# Patient Record
Sex: FEMALE | Race: WHITE | NOT HISPANIC OR LATINO | Employment: OTHER | URBAN - NONMETROPOLITAN AREA
[De-identification: names, ages, dates, MRNs, and addresses within clinical notes are randomized per-mention and may not be internally consistent; named-entity substitution may affect disease eponyms.]

---

## 2022-01-01 ENCOUNTER — RESULTS ONLY (OUTPATIENT)
Dept: RADIATION ONCOLOGY | Facility: HOSPITAL | Age: 82
End: 2022-01-01
Payer: COMMERCIAL

## 2022-01-01 ENCOUNTER — DOCUMENTATION ONLY (OUTPATIENT)
Dept: RADIATION ONCOLOGY | Facility: HOSPITAL | Age: 82
End: 2022-01-01
Payer: COMMERCIAL

## 2022-01-01 ENCOUNTER — TELEPHONE (OUTPATIENT)
Dept: RADIATION ONCOLOGY | Facility: HOSPITAL | Age: 82
End: 2022-01-01
Payer: COMMERCIAL

## 2022-01-01 ENCOUNTER — HEALTH MAINTENANCE LETTER (OUTPATIENT)
Age: 82
End: 2022-01-01

## 2022-01-01 ENCOUNTER — APPOINTMENT (OUTPATIENT)
Dept: RADIATION ONCOLOGY | Facility: HOSPITAL | Age: 82
End: 2022-01-01
Payer: COMMERCIAL

## 2022-01-01 ENCOUNTER — CARE COORDINATION (OUTPATIENT)
Dept: ONCOLOGY | Facility: OTHER | Age: 82
End: 2022-01-01

## 2022-01-01 ENCOUNTER — OFFICE VISIT (OUTPATIENT)
Dept: RADIATION ONCOLOGY | Facility: HOSPITAL | Age: 82
End: 2022-01-01
Attending: RADIOLOGY
Payer: MEDICARE

## 2022-01-01 ENCOUNTER — OFFICE VISIT (OUTPATIENT)
Dept: RADIATION ONCOLOGY | Facility: HOSPITAL | Age: 82
End: 2022-01-01
Payer: COMMERCIAL

## 2022-01-01 ENCOUNTER — ALLIED HEALTH/NURSE VISIT (OUTPATIENT)
Dept: RADIATION ONCOLOGY | Facility: HOSPITAL | Age: 82
End: 2022-01-01

## 2022-01-01 VITALS — BODY MASS INDEX: 18.03 KG/M2 | WEIGHT: 97 LBS

## 2022-01-01 VITALS — WEIGHT: 99.1 LBS | BODY MASS INDEX: 18.42 KG/M2

## 2022-01-01 VITALS — WEIGHT: 100.1 LBS | BODY MASS INDEX: 18.61 KG/M2

## 2022-01-01 VITALS — WEIGHT: 94.8 LBS | WEIGHT: 102 LBS | BODY MASS INDEX: 17.62 KG/M2 | BODY MASS INDEX: 18.96 KG/M2

## 2022-01-01 VITALS — WEIGHT: 96.8 LBS | BODY MASS INDEX: 18 KG/M2

## 2022-01-01 VITALS — WEIGHT: 102.1 LBS | BODY MASS INDEX: 18.98 KG/M2

## 2022-01-01 DIAGNOSIS — C67.8 MALIGNANT NEOPLASM OF OVERLAPPING SITES OF BLADDER (H): Primary | ICD-10-CM

## 2022-01-01 LAB
RAD ONC ARIA COURSE ID: NORMAL
RAD ONC ARIA COURSE LAST TREATMENT DATE: NORMAL
RAD ONC ARIA COURSE START DATE: NORMAL
RAD ONC ARIA COURSE TREATMENT ELAPSED DAYS: 10
RAD ONC ARIA COURSE TREATMENT ELAPSED DAYS: 11
RAD ONC ARIA COURSE TREATMENT ELAPSED DAYS: 14
RAD ONC ARIA COURSE TREATMENT ELAPSED DAYS: 15
RAD ONC ARIA COURSE TREATMENT ELAPSED DAYS: 16
RAD ONC ARIA COURSE TREATMENT ELAPSED DAYS: 17
RAD ONC ARIA COURSE TREATMENT ELAPSED DAYS: 18
RAD ONC ARIA COURSE TREATMENT ELAPSED DAYS: 21
RAD ONC ARIA COURSE TREATMENT ELAPSED DAYS: 22
RAD ONC ARIA COURSE TREATMENT ELAPSED DAYS: 23
RAD ONC ARIA COURSE TREATMENT ELAPSED DAYS: 24
RAD ONC ARIA COURSE TREATMENT ELAPSED DAYS: 25
RAD ONC ARIA COURSE TREATMENT ELAPSED DAYS: 28
RAD ONC ARIA COURSE TREATMENT ELAPSED DAYS: 29
RAD ONC ARIA COURSE TREATMENT ELAPSED DAYS: 30
RAD ONC ARIA COURSE TREATMENT ELAPSED DAYS: 31
RAD ONC ARIA COURSE TREATMENT ELAPSED DAYS: 35
RAD ONC ARIA COURSE TREATMENT ELAPSED DAYS: 36
RAD ONC ARIA COURSE TREATMENT ELAPSED DAYS: 37
RAD ONC ARIA COURSE TREATMENT ELAPSED DAYS: 38
RAD ONC ARIA COURSE TREATMENT ELAPSED DAYS: 39
RAD ONC ARIA COURSE TREATMENT ELAPSED DAYS: 42
RAD ONC ARIA COURSE TREATMENT ELAPSED DAYS: 43
RAD ONC ARIA COURSE TREATMENT ELAPSED DAYS: 44
RAD ONC ARIA COURSE TREATMENT ELAPSED DAYS: 7
RAD ONC ARIA COURSE TREATMENT ELAPSED DAYS: 8
RAD ONC ARIA COURSE TREATMENT ELAPSED DAYS: 9
RAD ONC ARIA FIRST TREATMENT DATE: NORMAL
RAD ONC ARIA PLAN FRACTIONS TREATED TO DATE: 1
RAD ONC ARIA PLAN FRACTIONS TREATED TO DATE: 1
RAD ONC ARIA PLAN FRACTIONS TREATED TO DATE: 10
RAD ONC ARIA PLAN FRACTIONS TREATED TO DATE: 11
RAD ONC ARIA PLAN FRACTIONS TREATED TO DATE: 12
RAD ONC ARIA PLAN FRACTIONS TREATED TO DATE: 13
RAD ONC ARIA PLAN FRACTIONS TREATED TO DATE: 14
RAD ONC ARIA PLAN FRACTIONS TREATED TO DATE: 15
RAD ONC ARIA PLAN FRACTIONS TREATED TO DATE: 16
RAD ONC ARIA PLAN FRACTIONS TREATED TO DATE: 17
RAD ONC ARIA PLAN FRACTIONS TREATED TO DATE: 18
RAD ONC ARIA PLAN FRACTIONS TREATED TO DATE: 19
RAD ONC ARIA PLAN FRACTIONS TREATED TO DATE: 2
RAD ONC ARIA PLAN FRACTIONS TREATED TO DATE: 2
RAD ONC ARIA PLAN FRACTIONS TREATED TO DATE: 20
RAD ONC ARIA PLAN FRACTIONS TREATED TO DATE: 3
RAD ONC ARIA PLAN FRACTIONS TREATED TO DATE: 3
RAD ONC ARIA PLAN FRACTIONS TREATED TO DATE: 4
RAD ONC ARIA PLAN FRACTIONS TREATED TO DATE: 4
RAD ONC ARIA PLAN FRACTIONS TREATED TO DATE: 5
RAD ONC ARIA PLAN FRACTIONS TREATED TO DATE: 5
RAD ONC ARIA PLAN FRACTIONS TREATED TO DATE: 6
RAD ONC ARIA PLAN FRACTIONS TREATED TO DATE: 6
RAD ONC ARIA PLAN FRACTIONS TREATED TO DATE: 7
RAD ONC ARIA PLAN FRACTIONS TREATED TO DATE: 7
RAD ONC ARIA PLAN FRACTIONS TREATED TO DATE: 8
RAD ONC ARIA PLAN FRACTIONS TREATED TO DATE: 9
RAD ONC ARIA PLAN ID: NORMAL
RAD ONC ARIA PLAN NAME: NORMAL
RAD ONC ARIA PLAN PRESCRIBED DOSE PER FRACTION: 2 GY
RAD ONC ARIA PLAN TOTAL FRACTIONS PRESCRIBED: 20
RAD ONC ARIA PLAN TOTAL FRACTIONS PRESCRIBED: 5
RAD ONC ARIA PLAN TOTAL FRACTIONS PRESCRIBED: 7
RAD ONC ARIA PLAN TOTAL PRESCRIBED DOSE: 1000 CGY
RAD ONC ARIA PLAN TOTAL PRESCRIBED DOSE: 1400 CGY
RAD ONC ARIA PLAN TOTAL PRESCRIBED DOSE: 4000 CGY
RAD ONC ARIA REFERENCE POINT DOSAGE GIVEN TO DATE: NORMAL GY
RAD ONC ARIA REFERENCE POINT ID: NORMAL

## 2022-01-01 PROCEDURE — 77386 HC IMRT TREATMENT DELIVERY, COMPLEX: CPT | Performed by: RADIOLOGY

## 2022-01-01 PROCEDURE — 77427 RADIATION TX MANAGEMENT X5: CPT | Performed by: RADIOLOGY

## 2022-01-01 PROCEDURE — 77014 PR CT GUIDE FOR PLACEMENT RADIATION THERAPY FIELDS: CPT | Mod: 26 | Performed by: RADIOLOGY

## 2022-01-01 PROCEDURE — 77336 RADIATION PHYSICS CONSULT: CPT | Performed by: RADIOLOGY

## 2022-01-01 RX ORDER — ONDANSETRON 8 MG/1
8 TABLET, FILM COATED ORAL 3 TIMES DAILY PRN
COMMUNITY
Start: 2022-01-01

## 2022-01-01 RX ORDER — CETIRIZINE HYDROCHLORIDE 10 MG/1
10 TABLET ORAL DAILY
COMMUNITY
Start: 2022-01-01

## 2022-01-01 RX ORDER — PROCHLORPERAZINE MALEATE 10 MG
10 TABLET ORAL
COMMUNITY
Start: 2022-01-01

## 2022-01-01 RX ORDER — LOPERAMIDE HCL 2 MG
2 CAPSULE ORAL 4 TIMES DAILY PRN
COMMUNITY
Start: 2022-01-01

## 2022-01-01 ASSESSMENT — ENCOUNTER SYMPTOMS
TROUBLE SWALLOWING: 0
RECTAL PAIN: 0
SLEEP DISTURBANCE: 1
DIFFICULTY URINATING: 1
HEADACHES: 0
WEAKNESS: 0
ADENOPATHY: 0
CONFUSION: 0
NERVOUS/ANXIOUS: 0
WOUND: 0

## 2022-01-01 ASSESSMENT — PAIN SCALES - GENERAL
PAINLEVEL: NO PAIN (0)
PAINLEVEL: SEVERE PAIN (6)
PAINLEVEL: MILD PAIN (2)
PAINLEVEL: MILD PAIN (2)
PAINLEVEL: MODERATE PAIN (5)
PAINLEVEL: SEVERE PAIN (6)
PAINLEVEL: SEVERE PAIN (6)
PAINLEVEL: MODERATE PAIN (5)

## 2022-03-01 RX ORDER — ACETAMINOPHEN 500 MG
500-1000 TABLET ORAL EVERY 6 HOURS PRN
COMMUNITY
End: 2022-01-01

## 2022-03-01 RX ORDER — PREDNISONE 1 MG/1
1 TABLET ORAL DAILY
COMMUNITY

## 2022-03-01 RX ORDER — OXYBUTYNIN CHLORIDE 5 MG/1
5 TABLET, EXTENDED RELEASE ORAL DAILY
COMMUNITY

## 2022-03-01 RX ORDER — OMEPRAZOLE 40 MG/1
40 CAPSULE, DELAYED RELEASE ORAL DAILY
COMMUNITY

## 2022-03-01 RX ORDER — PREDNISONE 5 MG/1
5 TABLET ORAL DAILY
COMMUNITY

## 2022-03-01 RX ORDER — HYDROCODONE BITARTRATE AND ACETAMINOPHEN 5; 325 MG/1; MG/1
1 TABLET ORAL EVERY 4 HOURS PRN
COMMUNITY
End: 2022-03-03

## 2022-03-03 RX ORDER — BIOTIN 1 MG
1000 TABLET ORAL DAILY
COMMUNITY
End: 2022-01-01

## 2022-03-03 RX ORDER — FAMOTIDINE 20 MG
2000 TABLET ORAL DAILY
COMMUNITY
End: 2022-01-01

## 2022-03-03 ASSESSMENT — PATIENT HEALTH QUESTIONNAIRE - PHQ9: SUM OF ALL RESPONSES TO PHQ QUESTIONS 1-9: 5

## 2022-03-03 ASSESSMENT — PAIN SCALES - GENERAL: PAINLEVEL: NO PAIN (0)

## 2022-03-03 NOTE — NURSING NOTE
"Spoke with patient on 3/3/2022, via phone prior to consult with Dr. Moran.  Jeannette was pleasant, cooperative, eager to communicate. Feels like her energy is good, she is upbeat and positive with conversation. She cares for self independently and uses no assistive devices for ambulation. She has a history of a bladder sling procedure and takes Ditropan, doesn't feel like it has helped her. Is willing to try other things to fix her urinary issues. She takes 6mg of Prednisone daily to help with her hair, reports taking this for years and if she stops her hair falls out. Denies pain, reports a \"down pressure, and poking feeling at all times\" in her bladder area. Takes occasional Tylenol to help this feeling.  She is concerned with the amount of times she has to urinate.  Reports having to go every half four around the clock. When she gets up to go at night she urinates \"1/3 cup to just a few drops\". Struggles with stress urinary incontinence, changing multiple pads through the day/night.  This has been ongoing for approximately a year. \"The down pressure\" is what had her go to the Doctor. She took Dulcolax this morning \"to empty out so I don't have that full feeling\", otherwise reports regular bowel habits.  Denies hematuria, no back or flank pain, absent for nausea and is able to eat a regular diet \"graze all day\".  Reports having poor sleep at night due to getting up every 1/2 hour. She has a base knowledge of cancer treatments from family and friends. Requests that if she gets radiation here at [facility] that it be in the morning so she can do chemo therapy after in [place], under the care of Dr. JOSEPH Friday.  She has no further questions or concerns at this time.  Her son will drive her and accompany to appointment tomorrow.     "

## 2022-03-03 NOTE — PROGRESS NOTES
[facility]  DEPARTMENT OF RADIATION ONCOLOGY  CONSULTATION NOTE    Name: Jeannette Johnston MRN: [id number]   : 1940 (81 year old)  Date of Service: Mar 4, 2022 Referring: Dr. Sanchez       Diagnosis and Cancer Staging  Malignant neoplasm of overlapping sites of bladder (H)  Staging form: Urinary Bladder, AJCC 8th Edition  - Clinical: Stage IIIA (cT3, cN0, cM0) - Signed by Suraj Moran MD on 3/4/2022       Summary   The patient is a 81 year old right-handed female who is actively treated for locally extensive bladder cancer with significant extravesicular extension but node-negative on CT. 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 Nodes: N0 per CT. Metastasis: M0 per CT.). Among the additional co-morbidities and social determinants affecting toxicity risk are the patient's commute (the patient in regards the commute as being long), reliance on her son for transportation and support, very poor urinary function (frequency, incontinence, adult disposable undergarments), prior bladder sling, and TURBT on 2022..  2022 CT    (Please note that EMR interfaces between institutions can result in loss of embedded images.)    History of Present Illness (CE)   The patient's oncologic history across multiple institutions is abstracted as follows:    Diagnosis/Staging/Treatment:    2021 Medicine evaluation: Presented with persistent dysuria. Also noted urgency, incomplete voiding, and worsening leakage with Valsalva maneuvers (sneeze, laugh, etc.). Did not resolve  with amoxicillin. Ultimately referred to urology.    01/20/202 Urology consultation: Reported worsening urinary function. Urinating approximately every hour during the day and waking approximately 3 times a night to urinate. Uncontrolled spontaneous passage of urine approximately 4-5 times per day (neal incontinence) that required 2-3 pads per day. Prior history of bladder sling performed approximately 20 years ago. Ditropan prescribed.    01/21/2022 Medicine follow-up: Re-evaluated for urinary frequency and incontinence. Reported pain in the left groin and right hip. Plain films demonstrated degenerative disease (pelvic fracture approximately 20 years ago).    02/01/2022 Urology follow-up: Office cystoscopy revealed a large left anterior bladder wall tumor. Recommended formal cystoscopy and resection of bladder tumor.    02/04/2022 Abdomen/pelvis CT with contrast: Large bladder mass measuring approximately 7.1-cm and involving most of the left bladder. Disease extending into the soft tissues at the left ureterovesical junction. Infiltration of perivesicular fat without discrete nodularity. No suspicious regional nodes or liver masses. Right adnexal cyst measuring 3-cm.    02/08/2022 Extensive but limited transurethral resection of bladder tumor (TURBT): Clinical impression that the bulk of the tumor extended outside the bladder wall. Operative report described an uncomplicated procedure. Incomplete resection due to massive extent of tumor. Pathology as above.    02/15/2022 Chest CT with contrast: Extensive emphysematous changes. No suspicious pulmonary, hilar, or mediastinal metastases. Nonspecific thyroid nodules measuring up to 1-cm (right).    02/22/2022 Medical oncology consultation: Suboptimal candidate for platinum-based neoadjuvant chemotherapy due to poor renal function. Recommended concurrent chemoradiation with gemcitabine for bladder preservation as an alternative to cystectomy.    We reviewed other  "conditions that can influence the choice of therapy and its morbidity. The patient lives with her son and relies on him for transportation and support. She has no prior in-depth knowledge of radiation therapy. The patient feels in fair general health. Nevertheless, she prides her self as historically being fiercely independent, very active physically, and previously very active socially (since the COVID-19 pandemic, the patient and her son have gone to extreme lengths to avoid contact with people). She referred to being nicknamed, \"Zippy\". Her principal complaint is her worsening urinary function. She urinates approximately every 30 minutes (she urinated multiple times during today's visit),, wears multiple adult disposable undergarments per day, and urinates at night without control lower forewarning (neal urinary incontinence). Urinary function has not improved since the limited TURBT. The patient's risk factors for bladder cancer include her advanced age, prior cigarette use, and chronic poor bladder function. She denies a history of Crohn's disease, ulcerative colitis, or collagen vascular diseases. She denies a history of hip replacement or avascular necrosis. She denies a history of radiation exposure, chemotherapy, or collagen vascular diseases. She denies recent known exposure to COVID-19, risky behaviors, or related symptoms including febrile, chest, gustatory, gastrointestinal, and systemic complaints.    Chemotherapy History: None.  Radiation History: None.  Implanted Cardiac Devices: None.  Implanted Chest Wall Infusion Port: None.    Past Medical History:   Diagnosis Date     Benign neoplasm of colon 02/26/2008     Closed displaced fracture of styloid process of left ulna, initial encounter 05/01/2015    resolved     Diplopia 08/01/2006    resolved     Dysesthesia of scalp 06/19/2019     Female stress incontinence 02/19/2007     Fracture of wrist 04/24/2015    Transverse fracture through the distal " radius     Generalized anxiety disorder      GERD (gastroesophageal reflux disease) 06/24/2013    12/10/2013 EGD Alberts's like changes in the low esophagus, Gastritis and old healed ulcerations, Path: shows chronic inflammation NO Barretts     Lyme disease 08/08/2006    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 Diverticulosis     COLONOSCOPY  04/23/2013    Very difficult exam and could not get out of the sigmoid with the colonoscope so EGD scope was used and we were able to pass this to the prox Right colon but the scope was not long enough to reach the cecum.  No polyps.     EGD TRANSORAL DIAGNOSTIC  12/10/2013    Alberts's like changes in the low esophagus. Gastirits and old healed ulcerations. Path results pending.     PERIPHERAL/INSERT CV CATHETER WITHOUT PORT/PUMP >5 YEARS  08/15/2006     UPPER GASTROINTESTINAL ENDOSCOPY  04/07/2016    Procedure: ESOPHAGOGASTRODUODENOSCOPY DIAGNOSTIC;  [facility];  Location: [facility] ENDOSCOPY  [address]     Outpatient Encounter Medications as of 3/4/2022   Medication Sig Dispense Refill     omeprazole (PRILOSEC) 40 MG DR capsule Take 40 mg by mouth daily Take 1 Capsule by mouth two times a day. Do not crush.  Take 20 mins before meals (Patient not taking: Reported on 4/27/2022)       oxybutynin ER (DITROPAN-XL) 5 MG 24 hr tablet Take 5 mg by mouth daily Take 1 Tablet by mouth one time a day. Do not crush. (Patient not taking: Reported on 4/27/2022)       predniSONE (DELTASONE) 1 MG tablet Take 1 mg by mouth daily TAKE 1 TAB BY MOUTH ONE TIME A DAY. TAKE WITH 5 MG TAB FOR TOTAL DOSE 6 MG PER DAY. TAKE WITH FOOD. (Patient not taking: No sig reported)       predniSONE (DELTASONE) 5 MG tablet Take 5 mg by mouth daily TAKE 1 TAB BY MOUTH ONE TIME A DAY. TAKE WITH 5 MG TAB FOR TOTAL DOSE 6 MG PER DAY. TAKE WITH FOOD. (Patient not taking: No sig reported)       [DISCONTINUED] biotin 1000 MCG TABS tablet Take 1,000 mcg by mouth daily       [DISCONTINUED] Vitamin D, Cholecalciferol, 25 MCG (1000 UT) CAPS Take 2,000 Units by mouth daily       [DISCONTINUED] acetaminophen (TYLENOL) 500 MG tablet Take 500-1,000 mg by mouth every 6 hours as needed for mild pain        [DISCONTINUED] HYDROcodone-acetaminophen (NORCO) 5-325 MG tablet Take 1 tablet by mouth every 4 hours as needed for severe pain Take 1 Tablet by mouth every four hours as needed for Pain . Limit acetaminophen to 4000 mg per day from all sources.       No facility-administered encounter medications on file as of 3/4/2022.     Allergies/Reactions: Patient has no known allergies.    Orders via Epic EMR: No orders of the defined types were placed in this encounter.    Social History     Social History Narrative    Jeannette, got her certificate in Nutrition management that she used when she worked at the [employer]ing home, retired in 1993. She is , mother of two children (daughter & son).  Her son is  and lives with her.  She reports she is  "ratna to have him there to help with hauling in wood and help with chores.  Her daughter lives 16 miles away from her and also helps when needed.  She has four grandchildren, two boys and two girls.  Has her drivers licence however lets her son do the driving. She reports her energy is \"good\" and stated, \"you have to get off the chair and do it\". Her hobbies include, crocheting, puzzles, and reading books.     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     Social History     Tobacco Use     Smoking status: Former Smoker     Packs/day: 1.50     Years: 35.00     Pack years: 52.50     Types: Cigarettes     Quit date: 1997     Years since quittin.2     Smokeless tobacco: Never Used   Vaping Use     Vaping Use: Never used   Substance Use Topics     Alcohol use: Yes     Comment: Occasional beer     Drug use: Never     Family History   Problem Relation Age of Onset     Tuberculosis Mother      Myocardial Infarction Father      Basal cell carcinoma Father      Heart Disease Father      Ovarian Cancer Sister      Heart Disease Maternal Grandmother      Cerebrovascular Disease Maternal Grandmother      Cerebrovascular Disease Paternal Grandfather      Breast Cancer Maternal Aunt      Ovarian Cancer Cousin    No other cancers in first or second degree relatives.    Baseline Review of Systems (prior to radiation therapy; supplemental to the History)   Review of Systems   Constitutional: Positive for fatigue (tired due to lack of sleep, but still has energy to clean and do household activities ). Negative for activity change, appetite change and unexpected weight change.   HENT: Negative for hearing loss and trouble " "swallowing.    Eyes: Negative for visual disturbance.   Respiratory: Negative for shortness of breath.    Cardiovascular: Negative for leg swelling.   Gastrointestinal: Positive for constipation (at times ). Negative for abdominal pain, anal bleeding, blood in stool, diarrhea, nausea, rectal pain and vomiting.   Genitourinary: Positive for decreased urine volume, difficulty urinating, frequency, pelvic pain (constant pressure; increased pain when urinating) and urgency. Negative for dysuria, flank pain, hematuria and vaginal discharge.   Musculoskeletal: Negative for gait problem.   Skin: Negative for rash and wound.   Neurological: Negative for dizziness, weakness, light-headedness and headaches.   Hematological: Negative for adenopathy.   Psychiatric/Behavioral: Positive for sleep disturbance (wakes very 30 minutes to urinate). Negative for behavioral problems and confusion. The patient is not nervous/anxious.      Patient Health Questionnaire-9 (PHQ-9)     PHQ 3/3/2022   PHQ-9 Total Score 5   Q9: Thoughts of better off dead/self-harm past 2 weeks Not at all     Physical Exam   KPS: 70 (cares for self but unable to carry on normal activity or do active work).  ECOG Status: 2 (Ambulatory and capable of all selfcare but unable to carry out any work activities; may need help with IADLs up and about > 50% of waking hours)   Vitals: Ht 1.562 m (5' 1.5\")   Wt 47.5 kg (104 lb 12.8 oz)   BMI 19.48 kg/m  .    Clinical Examination:  General: Upbeat, dynamic, engaging, energetic, spry. Appears clinically/medically stable. Appears in good general health. Appears fit. Appears rested. Appears stated age. Appears thin yet well-developed, well-nourished, and in no acute distress. Hernandez not appear acutely or chronically ill. Appears well groomed.  Head: No alopecia. Normocephalic.  Eyes: Glasses. Anicteric, vision intact.  ENT: Good volume. No hoarseness.  Neck: Soft, supple, symmetric, trachea midline.  Lymphatics: No cervical, " supraclavicular, suprasternal, periumbilical, or inguinal adenopathy.  Chest/Breasts: No port. No implanted cardiac device.  Lungs: Easy respirations, no use of accessory muscles. Clear to auscultation  Cardiovascular: No jugulovenous distension. No carotid pulsations. RRR, S1, S2.  Abdomen: Nondistended, nontender. Soft. Bowel sounds positive.  Pelvis: Urinated multiple times during today's visit. Nondistended, nontender. Soft. Bowel sounds positive.  MSK: Shoulder range of motion is good. No arm edema or hand edema. Good muscle tone. No tenderness on palpation. Good posture. No cords or calf tenderness.  Integument: No jaundice, pallor or ecchymosis.  Neurologic: Right-handed. Alert, oriented, fluent. Memory intact. Motor intact. Sensory intact. No ataxia.  Psychologic: Well-spoken about her cancer and therapy. Good dynamic with son (lives with son). Motivated. Clear, consistent thoughts and opinions. Led portions of the discussion. Good level of critical thinking and intelligence. Good comprehension and processing of new information. Did not require repeating of questions or answers. Progressively complex discussions and questions. Well-constructed answers. Complex speech and thought patterns. Appropriately anxious about radiation therapy and sad about diagnosis. Upbeat, relaxed, confident, and engaging. Pleasant, cooperative, insightful, concrete, and good judgment.    Physician Time on Day of Encounter, and MDM Data Reviewed and Analyzed on Day of Encounter   Time spent on patient activities is based on Chart review, Visit, and Documentation. Total time: 216 minutes.    MDM based on:       High risk element:  o Locally advanced bladder cancer. Urinary incontinence. Pending chemotherapy. Pending concurrent chemoradiation to the bladder.       Tests, documents, or independent historian(s) analyses include but are not limited to:  o Those referenced above in the HPI.       Independent Interpretations of tests  include but are not limited to:  o Those referenced above in the HPI.  o Ordered CT-the simulation.    Physician Information Review   I reviewed the case with the patient and her son, evaluated her, and discussed her treatment options and risks of therapy. I reviewed the medical records, radiographic information, and pathologic studies. I reviewed the imaging studies via PACS and with the patient and her son. I reviewed our intake sheets. I reviewed the case with medical oncology. The patient and her family are good historians, began the visit with good insights into the disease process, and acknowledged the potentially poor consequences of her disease. Her son  in particular has educated himself through a variety of educational media including the Internet.They demonstrated good comprehension of our discussion and explored the treatment options with good understanding. They asked pertinent questions and insightful follow-up questions. I illustrated the anatomy and field of treatment. We discussed the onsite and telemedicine coverage of our clinic. I offered to speak with her family members and friends today by speakerphone. The patient declined my offer but granted me permission to speak with them if they contact me or come to clinic. They declined referral for an additional opinion or conservative care. They accepted referral to our social work staff for additional resources including potential counseling. She granted me permission to exchange medical information and records with healthcare professionals. No therapeutic radiation protocols for the patient's disease are available at our Center. The patient was ready to learn and demonstrated no learning barriers. Her learning preferences included listening. She was given ample opportunity to ask questions, and all questions were answered to her satisfaction. We reviewed educational materials including Internet resources and provided the patient with written  materials.    We discussed the patient's diagnosis of a large bladder cancer, regional anatomy, stage designation, and risk-group. We reviewed patterns of failure after various local and systemic treatments. We reviewed her fair prognosis with multimodality treatment. We discussed factors specific to her disease including her personal circumstances, comorbidities, and other factors potentially impacting the risk of toxicity and clinical outcomes. We reviewed the concept of multimodality care and the relative contribution of each to the paradigm of treating bladder cancer. We discussed the use of radiation therapy as the principal therapeutic modality and omission of additional surgery for upfront local therapy. We discussed limitations of radiographic imaging in identifying the extent of disease. We discussed the potentially beneficial role of radiation therapy in the context of evolving standards and national guidelines of oncologic care such as the NCCN, ACR, and RUPA as well as management during the COVID-19 pandemic.    We discussed the nature of external beam radiation therapy from a Varian TrueBeam linac, concept of CT-based simulation, role of computerized treatment planning, and potential interventions to reduce the toxicity of radiation while improving the efficacy of treatment for bladder cancer. We would likely use image-guidance of either 3D or VMAT beam arrangements. Together, these techniques permit good coverage of complex target tissues (bladder, regional nodes) while maintaining adequate sparing of normal critical structures (rectum, small bowel. large bowel, hips, etc.). I would not use stereotactic radiation therapy, protons, TomoTherapy, brachytherapy, or radioprotectant agents. I do not feel that these methods offer a clear benefit for this patient. The patient appears to have no absolute contraindications to radiation therapy.    We discussed the relative risks, benefits, rationale, potential  side effects, alternatives, and adjuncts to radiation therapy for bladder cancer. Toxicities can be acute or chronic, and can negatively impact long-term quality of life. They can require high levels of supportive care and breaks in radiation therapy. I anticipate at least a moderate degree of acute urinary and rectal toxicity. Among the factors will increase the risk of toxicity are the co-morbidities and circumstances noted above. The toxicities include but are not limited to the genitourinary tract (dysuria, hematuria, frequency, obstruction, stenosis, urethritis, cystitis, incontinence, infertility, birth defects, etc.), gastrointestinal tract (nausea, vomiting, cramping, diarrhea, mucositis, bleeding, anorexia, impaired nutrition, perforation, fistula, adhesions, etc.), skin (poor cosmesis, erythema, hyperpigmentation, desquamation, breakdown, fibrosis, pruritus, etc.), hematopoietic system (anemia, thrombocytopenia, neutropenia, etc.), lymphatic system (local and regional lymphedema), peripheral nerves (weakness, numbness, paraesthesia, pain, etc.), vasculature (hemorrhage, fistula, obstruction, etc.), soft tissue necrosis, bone necrosis, pain, infection, and other organs as well as systemic toxicities (e.g., fatigue) and long-term risks such as second malignancy.     Physician Radiation Therapy Assessment/Plan   In summary, I concur with the recommendation of concurrent chemoradiation as definitive treatment for muscle-invasive, node-negative, surgically unresectable (primary extravesicular disease) bladder cancer. The patient has chronically poor urinary function but no relative or absolute contraindications to concurrent chemoradiation. However, she has numerous barriers to care as noted above in the summary. Given the circumstances, I favor either conventionally fractionated pelvic radiation therapy delivered over approximately 30-33 fractions or moderately hypofractionated radiation therapy that  incorporates a simultaneous integrated boost (SIB) delivered over a total of 20 fractions. Cytotoxic chemotherapy would be delivered concurrently with the radiation therapy. The patient and her son wished to proceed as recommended. We therefore obtained written consent. We were able to accommodate the patient's preference for simulation today and will attempt to accommodate the patient's request for early morning radiation unless there is a least one of the person or other works on a Friday afternoon besides very followed by chemotherapy in [place]. We answered all questions to their satisfaction. Thank you for allowing us to participate in the care of this very pleasant patient.      Suraj Moran MD, PhD  Roxy Caceres CNP  Department of Radiation Oncology  Tel: [phone]  Fax: [phone]    Care Team:  Keith Sanchez M.D., Ph.D. (medical oncology)  Calvin Davey M.D., Ph.D. (urology)  Jos Dee III, M.D. (medicine)

## 2022-03-04 ENCOUNTER — ONCOLOGY VISIT (OUTPATIENT)
Dept: RADIATION ONCOLOGY | Facility: HOSPITAL | Age: 82
End: 2022-03-04
Attending: RADIOLOGY
Payer: MEDICARE

## 2022-03-04 ENCOUNTER — ALLIED HEALTH/NURSE VISIT (OUTPATIENT)
Dept: RADIATION ONCOLOGY | Facility: HOSPITAL | Age: 82
End: 2022-03-04
Payer: COMMERCIAL

## 2022-03-04 VITALS — WEIGHT: 104.8 LBS | BODY MASS INDEX: 19.79 KG/M2 | HEIGHT: 61 IN

## 2022-03-04 DIAGNOSIS — C67.8 MALIGNANT NEOPLASM OF OVERLAPPING SITES OF BLADDER (H): Primary | ICD-10-CM

## 2022-03-04 DIAGNOSIS — C67.8 MALIGNANT NEOPLASM OF OVERLAPPING SITES OF BLADDER (H): Primary | Chronic | ICD-10-CM

## 2022-03-04 PROCEDURE — 77334 RADIATION TREATMENT AID(S): CPT | Mod: 26 | Performed by: RADIOLOGY

## 2022-03-04 PROCEDURE — 77263 THER RADIOLOGY TX PLNG CPLX: CPT | Performed by: RADIOLOGY

## 2022-03-04 PROCEDURE — 99417 PROLNG OP E/M EACH 15 MIN: CPT | Mod: GZ | Performed by: RADIOLOGY

## 2022-03-04 PROCEDURE — 99205 OFFICE O/P NEW HI 60 MIN: CPT | Mod: 25 | Performed by: RADIOLOGY

## 2022-03-04 PROCEDURE — G0463 HOSPITAL OUTPT CLINIC VISIT: HCPCS | Performed by: RADIOLOGY

## 2022-03-04 PROCEDURE — 77334 RADIATION TREATMENT AID(S): CPT | Performed by: RADIOLOGY

## 2022-03-04 ASSESSMENT — ENCOUNTER SYMPTOMS
NAUSEA: 0
HEMATURIA: 0
VOMITING: 0
ABDOMINAL PAIN: 0
LIGHT-HEADEDNESS: 0
UNEXPECTED WEIGHT CHANGE: 0
FLANK PAIN: 0
FREQUENCY: 1
FATIGUE: 1
DIARRHEA: 0
DYSURIA: 0
CONSTIPATION: 1
BLOOD IN STOOL: 0
ACTIVITY CHANGE: 0
APPETITE CHANGE: 0
SHORTNESS OF BREATH: 0
ANAL BLEEDING: 0
DIZZINESS: 0

## 2022-03-09 ENCOUNTER — TELEPHONE (OUTPATIENT)
Dept: RADIATION ONCOLOGY | Facility: HOSPITAL | Age: 82
End: 2022-03-09
Payer: COMMERCIAL

## 2022-03-09 NOTE — TELEPHONE ENCOUNTER
Spoke with patient for start date scheduling for Radiation treatment.  She agreed to Monday. March 14th, 2022 at 7:30 a.m. Leena Figueroa, Dosimetrist and Lavonne, from [facility] in [place] are aware of date and time.  Patient advised she will receive a call from, [facility] to schedule chemo treatments, Jeannette, verbalized understanding.

## 2022-03-10 PROCEDURE — 77301 RADIOTHERAPY DOSE PLAN IMRT: CPT | Performed by: RADIOLOGY

## 2022-03-10 PROCEDURE — 77338 DESIGN MLC DEVICE FOR IMRT: CPT | Mod: 26 | Performed by: RADIOLOGY

## 2022-03-10 PROCEDURE — 77300 RADIATION THERAPY DOSE PLAN: CPT | Performed by: RADIOLOGY

## 2022-03-10 PROCEDURE — 77338 DESIGN MLC DEVICE FOR IMRT: CPT | Performed by: RADIOLOGY

## 2022-03-10 PROCEDURE — 77300 RADIATION THERAPY DOSE PLAN: CPT | Mod: 26 | Performed by: RADIOLOGY

## 2022-03-10 PROCEDURE — 77301 RADIOTHERAPY DOSE PLAN IMRT: CPT | Mod: 26 | Performed by: RADIOLOGY

## 2022-03-14 ENCOUNTER — APPOINTMENT (OUTPATIENT)
Dept: RADIATION ONCOLOGY | Facility: HOSPITAL | Age: 82
End: 2022-03-14
Attending: RADIOLOGY
Payer: MEDICARE

## 2022-03-14 ENCOUNTER — RESULTS ONLY (OUTPATIENT)
Dept: RADIATION ONCOLOGY | Facility: HOSPITAL | Age: 82
End: 2022-03-14
Payer: COMMERCIAL

## 2022-03-14 LAB
RAD ONC ARIA COURSE ID: NORMAL
RAD ONC ARIA COURSE LAST TREATMENT DATE: NORMAL
RAD ONC ARIA COURSE START DATE: NORMAL
RAD ONC ARIA COURSE TREATMENT ELAPSED DAYS: 0
RAD ONC ARIA FIRST TREATMENT DATE: NORMAL
RAD ONC ARIA PLAN FRACTIONS TREATED TO DATE: 1
RAD ONC ARIA PLAN ID: NORMAL
RAD ONC ARIA PLAN NAME: NORMAL
RAD ONC ARIA PLAN PRESCRIBED DOSE PER FRACTION: 2 GY
RAD ONC ARIA PLAN TOTAL FRACTIONS PRESCRIBED: 20
RAD ONC ARIA PLAN TOTAL PRESCRIBED DOSE: 4000 CGY
RAD ONC ARIA REFERENCE POINT DOSAGE GIVEN TO DATE: NORMAL GY
RAD ONC ARIA REFERENCE POINT ID: NORMAL

## 2022-03-14 PROCEDURE — 77014 PR CT GUIDE FOR PLACEMENT RADIATION THERAPY FIELDS: CPT | Mod: 26 | Performed by: RADIOLOGY

## 2022-03-14 PROCEDURE — 77386 HC IMRT TREATMENT DELIVERY, COMPLEX: CPT | Performed by: RADIOLOGY

## 2022-03-15 ENCOUNTER — RESULTS ONLY (OUTPATIENT)
Dept: RADIATION ONCOLOGY | Facility: HOSPITAL | Age: 82
End: 2022-03-15
Payer: COMMERCIAL

## 2022-03-15 ENCOUNTER — APPOINTMENT (OUTPATIENT)
Dept: RADIATION ONCOLOGY | Facility: HOSPITAL | Age: 82
End: 2022-03-15
Payer: COMMERCIAL

## 2022-03-15 LAB
RAD ONC ARIA COURSE ID: NORMAL
RAD ONC ARIA COURSE LAST TREATMENT DATE: NORMAL
RAD ONC ARIA COURSE START DATE: NORMAL
RAD ONC ARIA COURSE TREATMENT ELAPSED DAYS: 1
RAD ONC ARIA FIRST TREATMENT DATE: NORMAL
RAD ONC ARIA PLAN FRACTIONS TREATED TO DATE: 2
RAD ONC ARIA PLAN ID: NORMAL
RAD ONC ARIA PLAN NAME: NORMAL
RAD ONC ARIA PLAN PRESCRIBED DOSE PER FRACTION: 2 GY
RAD ONC ARIA PLAN TOTAL FRACTIONS PRESCRIBED: 20
RAD ONC ARIA PLAN TOTAL PRESCRIBED DOSE: 4000 CGY
RAD ONC ARIA REFERENCE POINT DOSAGE GIVEN TO DATE: NORMAL GY
RAD ONC ARIA REFERENCE POINT ID: NORMAL

## 2022-03-15 PROCEDURE — 77014 PR CT GUIDE FOR PLACEMENT RADIATION THERAPY FIELDS: CPT | Mod: 26 | Performed by: RADIOLOGY

## 2022-03-15 PROCEDURE — 77386 HC IMRT TREATMENT DELIVERY, COMPLEX: CPT | Performed by: RADIOLOGY

## 2022-03-16 ENCOUNTER — RESULTS ONLY (OUTPATIENT)
Dept: RADIATION ONCOLOGY | Facility: HOSPITAL | Age: 82
End: 2022-03-16
Payer: COMMERCIAL

## 2022-03-16 ENCOUNTER — OFFICE VISIT (OUTPATIENT)
Dept: RADIATION ONCOLOGY | Facility: HOSPITAL | Age: 82
End: 2022-03-16
Attending: RADIOLOGY
Payer: MEDICARE

## 2022-03-16 VITALS — WEIGHT: 103 LBS | BODY MASS INDEX: 19.15 KG/M2

## 2022-03-16 DIAGNOSIS — C67.8 MALIGNANT NEOPLASM OF OVERLAPPING SITES OF BLADDER (H): Primary | ICD-10-CM

## 2022-03-16 LAB
RAD ONC ARIA COURSE ID: NORMAL
RAD ONC ARIA COURSE LAST TREATMENT DATE: NORMAL
RAD ONC ARIA COURSE START DATE: NORMAL
RAD ONC ARIA COURSE TREATMENT ELAPSED DAYS: 2
RAD ONC ARIA FIRST TREATMENT DATE: NORMAL
RAD ONC ARIA PLAN FRACTIONS TREATED TO DATE: 3
RAD ONC ARIA PLAN ID: NORMAL
RAD ONC ARIA PLAN NAME: NORMAL
RAD ONC ARIA PLAN PRESCRIBED DOSE PER FRACTION: 2 GY
RAD ONC ARIA PLAN TOTAL FRACTIONS PRESCRIBED: 20
RAD ONC ARIA PLAN TOTAL PRESCRIBED DOSE: 4000 CGY
RAD ONC ARIA REFERENCE POINT DOSAGE GIVEN TO DATE: NORMAL GY
RAD ONC ARIA REFERENCE POINT ID: NORMAL

## 2022-03-16 PROCEDURE — 77386 HC IMRT TREATMENT DELIVERY, COMPLEX: CPT | Performed by: RADIOLOGY

## 2022-03-16 ASSESSMENT — PAIN SCALES - GENERAL: PAINLEVEL: SEVERE PAIN (6)

## 2022-03-16 NOTE — PROGRESS NOTES
[facility]  DEPARTMENT OF RADIATION ONCOLOGY   ON TREATMENT VISIT (OTV) NOTE    Name: Jeannette Johnston MRN: [id number]   : 1940 (81 year old)  Date of Service: Mar 16, 2022 Referring: Dr. Sanchez     Diagnosis and Cancer Staging  Malignant neoplasm of overlapping sites of bladder (H)  Staging form: Urinary Bladder, AJCC 8th Edition  - Clinical: Stage IIIA (cT3, cN0, cM0) - Signed by Suraj Moran MD on 3/4/2022      Radiation Therapy   Site: Pelvic LNs and Bladder with bladder boost by volumetric modulated arc therapy (VMAT)   Treatment to Date    Received 3 fraction(s) = 600 cGy (at 200 each)    Remaining 17 + 5+ 7  fraction(s)    Goal 32 fractions = 6400 cGy (4000, 1000, 1400)     Concurrent chemoradiation week 0 (fractions 1-5): Grade 0 toxicity due to radiation therapy (CTCAE 5.0).  Week 1 (-10):   Week 2 (11-15):   Week 3 (16-20):   Week 4 (21-25):   Week 5 (26-30):   Week 6 (31-35):     Recent History    General: Accompanied by son.  Finds setup and treatment to be easy.     Chemotherapy: Concurrent cytotoxic chemotherapy.  Regimen: Gemcitabine (C1D1 2022).  Most recent: 2022  Next dose: 2022  Labs: Reviewed from 2022. Adequate to continue therapy.  Growth factors: None.  Scheduled supplemental IV fluids: None.    :  Stable at baseline. (baseline: incontinent day and night). She is having a difficult time with night time incontinence due to leaking through her disposable briefs.   Index: Grade 0.  Intervention: Continue to use disposable briefs, try using disposable swetha pads, limit fluids in the afternoon/ after supper.   Impression: Possible changes within 1-2 weeks.    GI: Stable at baseline. (baseline: ).  Index: Grade 0.  Intervention: Observe.  Impression: Possible changes within 1-2 weeks.    Gyn:  Stable at baseline.  Index: Grade 0.  Intervention: Observe.   Impression: Possible changes within 1-2 weeks.    Pain: Reports 6/10 chronic pelvic  pain as a constant cramping similar to a menstrual cycle  Index: Grade 0.  Intervention: Start taking Midol (2 pills, 3 times a day as needed) STOP taking tylenol   Impression: Anticipate changes within 1-2 weeks.    Skin, mucosa: At baseline.   Index: Grade 0.  Intervention: Observe.  Impression: Possible changes possible within 1-2 weeks.    Anorexia, FEN, weight: Stable at baseline   Index: Grade 0.  Intervention: Observe.  Impression: Possible changes possible within 1-2 weeks.    Fatigue: Stable at baseline   Index: Grade 0.  Intervention: Observe.  Impression: Progressive changes possible within 1-2 weeks.    ID: Daily COVID-19 screening negative for barriers to proceeding with radiation therapy.    Physical Examination   Vitals: Wt 46.7 kg (103 lb)   BMI 19.15 kg/m    Wt Readings from Last 3 Encounters:   03/16/22 46.7 kg (103 lb)   03/03/22 47.5 kg (104 lb 12.8 oz)     Clinical Examination:  KPS: 80 (effort for normal activity; some signs or symptoms of disease).  General: Appears clinically stable. Appears in good general health. Appears non-obese. Appears rested. Appears stated age. Appears well-developed, well-nourished, and in no acute distress. Does not appear acutely or chronically ill. Appears well groomed.  Head: No alopecia. Normocephalic.  Eyes: Anicteric. Vision intact.  ENT: Good volume. No hoarseness.  Neck: No jugulovenous distension.  Lymphatics: Deferred.  Lungs: Easy respirations, no use of accessory muscles. Clear to auscultation.  Cardiovascular: No jugulovenous distension. No carotid pulsations. RRR, S1, S2.  Abdomen: Nondistended, nontender. Soft. Bowel sounds positive.  Pelvis: Non distended, tender to palpation .  MSK: Full shoulder range of motion. No arm edema or hand edema. No cords or calf tenderness.  Integument: No jaundice or pallor. No cellulitis.  Neurologic: Alert, oriented, fluent. Memory intact. Motor intact. Sensory intact. No ataxia.  Psychologic: Well-spoken about her  cancer and therapy. Good dynamic with son. Motivated, upbeat, relaxed, confident, and engaging. Pleasant, cooperative, insightful, and concrete.    Roxy Moran MD, PhD  Department of Radiation Oncology  Tel: [phone]  Fax: [phone]

## 2022-03-16 NOTE — PATIENT INSTRUCTIONS
1.  Midol from your local store and take 2 capsules every 6 hours as needed for pelvic pain. Do not take more than 6 pills in one day.     2. STOP taking your tylenol     3. Take with food    4. Limit fluids in the afternoon and at bed time to help with the excess night incontinence     5. Try using a disposable briefs and a disposable pad underneath you when you sleep.

## 2022-03-17 ENCOUNTER — APPOINTMENT (OUTPATIENT)
Dept: RADIATION ONCOLOGY | Facility: HOSPITAL | Age: 82
End: 2022-03-17
Payer: COMMERCIAL

## 2022-03-17 ENCOUNTER — RESULTS ONLY (OUTPATIENT)
Dept: RADIATION ONCOLOGY | Facility: HOSPITAL | Age: 82
End: 2022-03-17
Payer: COMMERCIAL

## 2022-03-17 LAB
RAD ONC ARIA COURSE ID: NORMAL
RAD ONC ARIA COURSE LAST TREATMENT DATE: NORMAL
RAD ONC ARIA COURSE START DATE: NORMAL
RAD ONC ARIA COURSE TREATMENT ELAPSED DAYS: 3
RAD ONC ARIA FIRST TREATMENT DATE: NORMAL
RAD ONC ARIA PLAN FRACTIONS TREATED TO DATE: 4
RAD ONC ARIA PLAN ID: NORMAL
RAD ONC ARIA PLAN NAME: NORMAL
RAD ONC ARIA PLAN PRESCRIBED DOSE PER FRACTION: 2 GY
RAD ONC ARIA PLAN TOTAL FRACTIONS PRESCRIBED: 20
RAD ONC ARIA PLAN TOTAL PRESCRIBED DOSE: 4000 CGY
RAD ONC ARIA REFERENCE POINT DOSAGE GIVEN TO DATE: NORMAL GY
RAD ONC ARIA REFERENCE POINT ID: NORMAL

## 2022-03-17 PROCEDURE — 77014 PR CT GUIDE FOR PLACEMENT RADIATION THERAPY FIELDS: CPT | Mod: 26 | Performed by: RADIOLOGY

## 2022-03-17 PROCEDURE — 77386 HC IMRT TREATMENT DELIVERY, COMPLEX: CPT | Performed by: RADIOLOGY

## 2022-03-18 ENCOUNTER — RESULTS ONLY (OUTPATIENT)
Dept: RADIATION ONCOLOGY | Facility: HOSPITAL | Age: 82
End: 2022-03-18
Payer: COMMERCIAL

## 2022-03-18 ENCOUNTER — APPOINTMENT (OUTPATIENT)
Dept: RADIATION ONCOLOGY | Facility: HOSPITAL | Age: 82
End: 2022-03-18
Payer: COMMERCIAL

## 2022-03-18 LAB
RAD ONC ARIA COURSE ID: NORMAL
RAD ONC ARIA COURSE LAST TREATMENT DATE: NORMAL
RAD ONC ARIA COURSE START DATE: NORMAL
RAD ONC ARIA COURSE TREATMENT ELAPSED DAYS: 4
RAD ONC ARIA FIRST TREATMENT DATE: NORMAL
RAD ONC ARIA PLAN FRACTIONS TREATED TO DATE: 5
RAD ONC ARIA PLAN ID: NORMAL
RAD ONC ARIA PLAN NAME: NORMAL
RAD ONC ARIA PLAN PRESCRIBED DOSE PER FRACTION: 2 GY
RAD ONC ARIA PLAN TOTAL FRACTIONS PRESCRIBED: 20
RAD ONC ARIA PLAN TOTAL PRESCRIBED DOSE: 4000 CGY
RAD ONC ARIA REFERENCE POINT DOSAGE GIVEN TO DATE: NORMAL GY
RAD ONC ARIA REFERENCE POINT ID: NORMAL

## 2022-03-18 PROCEDURE — 77336 RADIATION PHYSICS CONSULT: CPT | Performed by: RADIOLOGY

## 2022-03-18 PROCEDURE — 77427 RADIATION TX MANAGEMENT X5: CPT | Performed by: RADIOLOGY

## 2022-03-18 PROCEDURE — 77386 HC IMRT TREATMENT DELIVERY, COMPLEX: CPT | Performed by: RADIOLOGY

## 2022-03-18 PROCEDURE — 77014 PR CT GUIDE FOR PLACEMENT RADIATION THERAPY FIELDS: CPT | Mod: 26 | Performed by: RADIOLOGY

## 2022-03-23 NOTE — PROGRESS NOTES
[facility] [place]  DEPARTMENT OF RADIATION ONCOLOGY   ON TREATMENT VISIT (OTV) NOTE    Name: Jeannette Johnston MRN: [id number]   : 1940 (81 year old)  Date of Service: Mar 23, 2022 Referring: Dr. Sanchez     Diagnosis and Cancer Staging  Malignant neoplasm of overlapping sites of bladder (H)  Staging form: Urinary Bladder, AJCC 8th Edition  - Clinical: Stage IIIA (cT3, cN0, cM0) - Signed by Suraj Moran MD on 3/4/2022      Radiation Therapy   Site: Pelvic LNs and Bladder with bladder boost by volumetric modulated arc therapy (VMAT)   Treatment to Date    Received 8 fraction(s) = 1600 cGy (at 200 each)    Remaining 12 + 5+ 7  fraction(s)    Goal 32 fractions = 6400 cGy (4000, 1000, 1400)     Concurrent chemoradiation week 0 (fractions 1-5): Grade 0 toxicity due to radiation therapy (CTCAE 5.0).  Week 1 (06-10): grade 0   Week 2 (11-15):   Week 3 (16-20):   Week 4 (21-25):   Week 5 (26-30):   Week 6 (31-35):     Recent History    General: Accompanied by son.  Finds setup and treatment to be easy.     Chemotherapy: Concurrent cytotoxic chemotherapy.  Regimen: Gemcitabine (C1D1 2022).  Most recent: 2022  Next dose: 2022  Labs: Reviewed from 2022. Adequate to continue therapy.  Growth factors: None.  Scheduled supplemental IV fluids: None.    :  Stable at baseline. (baseline: incontinent day and night). She continues having a difficult time with night time incontinence due to leaking through her disposable briefs.   Index: Grade 0.  Intervention: Continue to use disposable briefs, can add in a disposable pad within the breif, continue using disposable swetha pads, limit fluids in the afternoon/ after supper.   Impression: Possible changes within 1-2 weeks.    GI: Stable at baseline. (baseline: ).  Index: Grade 0.  Intervention: Observe.  Impression: Possible changes within 1-2 weeks.    Gyn:  Stable at baseline.  Index: Grade 0.  Intervention: Observe.   Impression:  Possible changes within 1-2 weeks.    Pain: Reports 6/10 chronic pelvic pain as a constant cramping similar to a menstrual cycle. Midol helps with pain when taking, pain back to 6/10 without intervention. Patient educated to use Midol as needed.   Index: Grade 0.  Intervention: Start taking Midol (2 pills, 3 times a day as needed) STOP taking tylenol.   Impression: Anticipate changes within 1-2 weeks.    Skin, mucosa: At baseline.   Index: Grade 0.  Intervention: Observe.  Impression: Possible changes possible within 1-2 weeks.    Anorexia, FEN, weight: Stable at baseline   Index: Grade 0.  Intervention: Observe.  Impression: Possible changes possible within 1-2 weeks.    Fatigue: Stable at baseline   Index: Grade 0.  Intervention: Observe.  Impression: Progressive changes possible within 1-2 weeks.    ID: Daily COVID-19 screening negative for barriers to proceeding with radiation therapy.    Physical Examination   Vitals: Wt 46.3 kg (102 lb)   BMI 18.96 kg/m    Wt Readings from Last 3 Encounters:   03/23/22 46.3 kg (102 lb)   03/16/22 46.7 kg (103 lb)   03/03/22 47.5 kg (104 lb 12.8 oz)     Clinical Examination:  KPS: 80 (effort for normal activity; some signs or symptoms of disease).  General: Appears clinically stable. Appears in good general health. Appears non-obese. Appears rested. Appears stated age. Appears well-developed, well-nourished, and in no acute distress. Does not appear acutely or chronically ill. Appears well groomed.  Head: No alopecia. Normocephalic.  Eyes: Anicteric. Vision intact.  ENT: Good volume. No hoarseness.  Neck: No jugulovenous distension.  Lymphatics: Deferred.  Lungs: Easy respirations, no use of accessory muscles. Clear to auscultation.  Cardiovascular: No jugulovenous distension. No carotid pulsations. RRR, S1, S2.  Abdomen: Nondistended, nontender. Soft. Bowel sounds positive.  Pelvis: Non distended, tender to palpation .  MSK: Full shoulder range of motion. No arm edema or hand  edema. No cords or calf tenderness.  Integument: No jaundice or pallor. No cellulitis.  Neurologic: Alert, oriented, fluent. Memory intact. Motor intact. Sensory intact. No ataxia.  Psychologic: Well-spoken about her cancer and therapy. Good dynamic with son. Motivated, upbeat, relaxed, confident, and engaging. Pleasant, cooperative, insightful, and concrete.    Physician Radiation Therapy Information Review   Radiation Oncology weekly review: Reviewed available labs and diagnostic studies. Interpreted as adequate to proceed with radiation therapy. Discussed management with Therapy, Treatment Planning, and Nursing. Review or weekly routine QA, linac imaging, and clinical set up are adequate to proceed with radiation therapy as prescribed.    Physician Radiation Therapy Assessment   Routine tolerance to radiation therapy.    Physician Radiation Therapy Plan   No new radiation therapy interventions. Boost/cone down pending. Have reviewed the graphical 3D plan with attention to dose to the pelvic LNs and bladder. Reviewed anticipated time course, nature, and potential interventions for managing toxicity during and after radiation therapy. Patient aware of onsite and telemedicine coverage of our clinic. He wished to proceed with treatment. All questions answered to the satisfaction of the patient and family.    MD Roxy Benson CNP, MD, PhD  Department of Radiation Oncology  Tel: [phone]  Fax: [phone]

## 2022-03-30 NOTE — PROGRESS NOTES
[facility]  DEPARTMENT OF RADIATION ONCOLOGY   ON TREATMENT VISIT (OTV) NOTE    Name: Jeannette Johnston MRN: [id number]   : 1940 (81 year old)  Date of Service: Mar 30, 2022 Referring: Dr. Sanchez     Diagnosis and Cancer Staging  Malignant neoplasm of overlapping sites of bladder (H)  Staging form: Urinary Bladder, AJCC 8th Edition  - Clinical: Stage IIIA (cT3, cN0, cM0) - Signed by Suraj Moran MD on 3/4/2022      Radiation Therapy   Site: Pelvic LNs and Bladder with bladder boost by volumetric modulated arc therapy (VMAT)   Treatment to Date    Received 13 fraction(s) = 2600 cGy (at 200 each)    Remaining 7 + 5+ 7  fraction(s)    Goal 32 fractions = 6400 cGy (4000, 1000, 1400)     Concurrent chemoradiation week 0 (fractions 1-5): Grade 0 toxicity due to radiation therapy (CTCAE 5.0).  Week 1 (06-10): grade 0   Week 2 (11-15): grade 1 fatigue  Week 3 (16-20):   Week 4 (21-25):   Week 5 (26-30):   Week 6 (32):     Recent History    General: Accompanied by son.  Finds setup and treatment to be easy.     Chemotherapy: Concurrent cytotoxic chemotherapy.  Regimen: Gemcitabine (C1D1 2022).  Most recent: 2022  Next dose: 2022  Labs: Reviewed from 2022. Adequate to continue therapy.  Growth factors: None.  Scheduled supplemental IV fluids: None.    Pain: Reports 2/10 chronic pelvic pain as a constant cramping similar to a menstrual cycle. Pain is improved from last week with the use of Midol 2-3 times per day as needed, previous pain 6/10. Patient educated to continue to use Midol as needed.    Index: Grade 0.  Intervention: continue taking Midol (2 pills, 3 times a day as needed) STOP taking tylenol.   Impression: Anticipate changes within 1-2 weeks.    :  Stable at baseline. (baseline: incontinent day and night). She continues having a difficult time with night time incontinence due to leaking through her disposable briefs. She has started using a towel placed  "between her legs at night to help with absorption and \"save\" the sheets from being saturated.   Index: Grade 0.  Intervention: Continue to use disposable briefs, can add in a disposable pad within the breif, continue using disposable swetha pads, limit fluids in the afternoon/ after supper.   Impression: Possible changes within 1-2 weeks.    Fatigue: more tired than normal, starting to take naps   Index: Grade 1.  Intervention: Observe.  Impression: Progressive changes possible within 1-2 weeks.    GI: Stable at baseline. (baseline: constipated at times, loose stools at times).  Index: Grade 0.  Intervention: uses Doculax as needed, imodium as needed  Impression: Possible changes within 1-2 weeks.    Gyn:  Stable at baseline.  Index: Grade 0.  Intervention: Observe.   Impression: Possible changes within 1-2 weeks.    Skin, mucosa: At baseline.   Index: Grade 0.  Intervention: Observe.  Impression: Possible changes possible within 1-2 weeks.    Anorexia, FEN, weight: Stable at baseline   Index: Grade 0.  Intervention: Observe.  Impression: Possible changes possible within 1-2 weeks.        ID: Daily COVID-19 screening negative for barriers to proceeding with radiation therapy.    Physical Examination   Vitals: Wt 46.3 kg (102 lb 1.6 oz)   BMI 18.98 kg/m    Wt Readings from Last 3 Encounters:   03/30/22 46.3 kg (102 lb 1.6 oz)   03/23/22 46.3 kg (102 lb)   03/16/22 46.7 kg (103 lb)     Clinical Examination:  KPS: 80 (effort for normal activity; some signs or symptoms of disease).  General: Appears clinically stable. Appears in good general health. Appears non-obese. Appears rested. Appears stated age. Appears well-developed, well-nourished, and in no acute distress. Does not appear acutely or chronically ill. Appears well groomed.  Head: No alopecia. Normocephalic.  Eyes: Anicteric. Vision intact.  ENT: Good volume. No hoarseness.  Neck: No jugulovenous distension.  Lymphatics: Deferred.  Lungs: Easy respirations, no " use of accessory muscles. Clear to auscultation.  Cardiovascular: No jugulovenous distension. No carotid pulsations. RRR, S1, S2.  Abdomen: Nondistended, nontender. Soft. Bowel sounds positive.  Pelvis: Non distended, tender to palpation .  MSK: Full shoulder range of motion. No arm edema or hand edema. No cords or calf tenderness.  Integument: No jaundice or pallor. No cellulitis.  Neurologic: Alert, oriented, fluent. Memory intact. Motor intact. Sensory intact. No ataxia.  Psychologic: Well-spoken about her cancer and therapy. Good dynamic with son. Motivated, upbeat, relaxed, confident, and engaging. Pleasant, cooperative, insightful, and concrete.      Roxy Caceres CNP   Suraj Moran MD, PhD  Department of Radiation Oncology  Tel: [phone]  Fax: [phone]

## 2022-04-05 NOTE — PROGRESS NOTES
[facility]  DEPARTMENT OF RADIATION ONCOLOGY   ON TREATMENT VISIT (OTV) NOTE    Name: Jeannette Johnston MRN: [id number]   : 1940 (81 year old)  Date of Service: 2022 Referring: Dr. Sanchez     Diagnosis and Cancer Staging  Malignant neoplasm of overlapping sites of bladder (H)  Staging form: Urinary Bladder, AJCC 8th Edition  - Clinical: Stage IIIA (cT3, cN0, cM0) - Signed by Suraj Moran MD on 3/4/2022      Radiation Therapy   Site: Bladder and pelvic nodes using volumetric-modulated arc therapy (VMAT) with sequential boost.   Treatment to Date    Received 18 fraction(s) = 3600 cGy (at 200 each)    Remaining 2 + 5 + 7 fraction(s)    Goal 32 fractions (20 + 5 + 7) = 6400 cGy (4000 + 1000 + 1400)     Concurrent chemoradiation week 0 (fractions 1-5): Grade 0 toxicity due to radiation therapy (CTCAE 5.0).  Week 1 (06-10): Grade 0.  Week 2 (11-15): Grade 1 fatigue.  Week 3 (16-20): Grade 1 fatigue. Improved urinary function.  Week 4 (21-25):   Week 5 (26-30):   Week 6 (32):     Summary   (Please note that EMR interfaces between institutions can result in loss of embedded images).      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 In the setting of medically inoperable/surgically unresectable disease, medical oncology referred the patient for concurrent chemoradiation thereapy with potentially curative intent through bladder-sparing therapy (Primary:  7.1-cm disease, the bulk of which clinically extends beyond the bladder. Overlies left ureterovesical junction. Nodes: cN0 per CT. Metastasis: cM0 per CT. Markers: None applicable). Among the additional co-morbidities and social determinants affecting toxicity risk are chronic bilateral lower medial groin pain (presenting complaint), poor bladder function (incontinence, wet sheets, adult disposable undergarments), pubovaginal sling, and advanced  "age..  02/04/2022 CT      Recent History (CE)   General: Accompanied by son and Care Coordinator. Saw medical oncology yesterday. Ultimately tolerating treatment relatively well. Feels well. Offers no new complaints. Finds setup and treatment to be easy. No bowel or bladder preparation prior to daily radiation therapy.  Chemotherapy: Concurrent cytotoxic chemotherapy.  Regimen:  Twice weekly gemcitabine (C1D1 03/14/2022).  Most recent:  04/04/2022.  Next dose:  04/07/2022  Labs: Reviewed from /04/2022. Adequate to continue therapy.  Growth factors: None.  Scheduled supplemental IV fluids: None.  Pain: Stable pain syndrome. Clarified that the pain is located at the bilateral lower medial groin. Began on the left side at the time of presentation. Then started on the right side. Similar to menses pain (but anatomically extrapelvic). Remains 2/10 with Midol as needed (previously 6/10). Reaches 6/10 without Midol. Patient satisfied with pain regimen.  Index: Grade 0 due to radiation therapy.  Intervention: Continue Midol as needed, typically 2-3 times per day (additional acetaminophen previously discontinued).  Impression: Pain due to bladder cancer and present prior to the start of radiation therapy.  Fatigue: Notes more fatigue. Naps now (not before radiation therapy).  Index: Grade 1.  Intervention: Observe.   Impression: Patient known as \"Zippy\" and usually very active. Remains active but nap is atypical for her baseline.  :  Reports 1 week history of improved urination. Can now sometimes control initiation of void on the toilet. Otherwise, stable at compromised baseline (incontinent day/night, adult disposable undergarments, leaks through undergarments at night, wet sheets). Denies hematuria or dysuria. No foul smelling or clouded urine.  Index: Grade 0 due to radiation therapy.  Intervention: Continue disposable undergarment. Use chux pads or towels at night as needed.  Impression: Do not anticipate significant " improvement of bladder function after completion of chemoradiation (due to long history of bladder dysfunction and degree of bladder wall destruction by cancer).  GI: Stable at baseline (occasional constipation versus loose stools).  Index: Grade 0 due to radiation therapy.  Intervention: Continue Dulcolax versus Imodium as needed (historical preference).   Impression: Ongoing complaints represent patient's historical based rather than treatment-related toxicity.  Gyn:        No complaints. Denies vaginal discharge or passage of urine through vagina.  Index: Grade 0.  Intervention: Observe.   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 Previously counseled about posttreatment use of vaginal dilator (in lieu of sexual activity) to maintain pliability to permit formal gynecologic evaluation.  ID: Daily COVID-19 screening negative for barriers to proceeding with radiation therapy.  Follow-up: After radiation therapy, refer back to medical oncology and urology for clinical and radiographic surveillance. Anticipate possible pelvic CT approximately 1 month after the end of radiation therapy to assess treatment response and the role of cystoscopy for surveillance if radiographically without evidence of significant disease.    ROS (score of 0 indicates that symptom is at baseline for most sections below)   See Flowsheet for additional comments as indicated.  Severe Pain (6) due to bilateral lower groins.                               Physical Examination   Vitals: There were no vitals taken for this visit.  Wt Readings from Last 3 Encounters:   04/06/22 45.4 kg (100 lb 1.6 oz)   03/30/22 46.3 kg (102 lb 1.6 oz)   03/23/22 46.3 kg (102 lb)     Clinical Examination (during the week, additional physician evaluations at linac with the staff):  KPS: 70 (cares for self but unable to carry on normal activity or do active work).  General: Bright, dynamic, engaging. Appears clinically stable. Appears in excellent general  health. Appears non-obese. Appears rested. Appears stated age. Appears well-developed, well-nourished, and in no acute distress. Does not appear acutely or chronically ill. Appears well groomed.  Head: No alopecia. Normocephalic.  Eyes: Glasses. Anicteric. Vision intact.  ENT: Good volume. No hoarseness.  Neck: No jugulovenous distension.  Lymphatics: Deferred.  Chest/Breasts: No port. No implanted cardiac device.   Lungs: Easy respirations, no use of accessory muscles. Clear to auscultation.  Cardiovascular: No jugulovenous distension. No carotid pulsations. RRR, S1, S2.  Abdomen: No erythema. Obesely distended. Otherwise, nondistended and nontender. Soft. Bowel sounds positive.  Pelvis: Adult disposable undergarment. No erythema. Obesely distended. Otherwise, nondistended and nontender. Soft. Bowel sounds positive.  MSK: Shoulder range of motion is good. No arm edema or hand edema.   Integument: No jaundice or pallor.  Neurologic: Right-handed. Alert, oriented, fluent. Memory intact. Motor intact. Sensory intact. No ataxia.  Psychologic: Well-spoken about her cancer and therapy. Good dynamic with son Motivated, upbeat, relaxed, confident, and engaging. Pleasant, cooperative, insightful, and concrete.    Physician Radiation Therapy Impression   Routine tolerance to radiation therapy.    Physician Radiation Therapy Plan   No new interventions. Boost/cone down pending. Have reviewed graphical 3D plan with the patient and her son. Reviewed anticipated time course, nature, and potential interventions for managing toxicity during and after radiation therapy. Patient aware of onsite and telemedicine coverage of our clinic. She wished to proceed with treatment. All questions answered to the satisfaction of the patient and her son.    Physician Radiation Therapy Weekly Review   Reviewed available labs and diagnostic studies. Interpreted as adequate to proceed with radiation therapy. Discussed management with Therapy,  Treatment Planning, and Nursing. Reviewed weekly routine QA, linac imaging, and clinical set up are adequate to proceed with radiation therapy as prescribed.    Allergies, Medications   Allergies/Reactions: Patient has no known allergies.    Outpatient Encounter Medications as of 4/6/2022   Medication Sig Dispense Refill     acetaminophen-caff-pyrilamine (MIDOL MENSTRUAL) 500-60-15 MG TABS per tablet Take 2 tablets by mouth 3 times daily Some days is taking three times per day and some 2 times per day       biotin 1000 MCG TABS tablet Take 1,000 mcg by mouth daily       omeprazole (PRILOSEC) 40 MG DR capsule Take 40 mg by mouth daily Take 1 Capsule by mouth two times a day. Do not crush.  Take 20 mins before meals       oxybutynin ER (DITROPAN-XL) 5 MG 24 hr tablet Take 5 mg by mouth daily Take 1 Tablet by mouth one time a day. Do not crush.       predniSONE (DELTASONE) 1 MG tablet Take 1 mg by mouth daily TAKE 1 TAB BY MOUTH ONE TIME A DAY. TAKE WITH 5 MG TAB FOR TOTAL DOSE 6 MG PER DAY. TAKE WITH FOOD.       predniSONE (DELTASONE) 5 MG tablet Take 5 mg by mouth daily TAKE 1 TAB BY MOUTH ONE TIME A DAY. TAKE WITH 5 MG TAB FOR TOTAL DOSE 6 MG PER DAY. TAKE WITH FOOD.       Vitamin D, Cholecalciferol, 25 MCG (1000 UT) CAPS Take 2,000 Units by mouth daily       No facility-administered encounter medications on file as of 4/6/2022.          Suraj Moran MD, PhD  Department of Radiation Oncology  Tel: [phone]  Fax: [phone]

## 2022-04-06 NOTE — PROGRESS NOTES
"Care Coordination Note:    SW reached out to patient via telephone today. SW explained to patient her role in the unit and reason for today's call. Patient stated her understanding. When SW inquired how things were going and if there was anything she needed assistance with at this time, she stated, \"Things have been good so far.\"   Patient was thankful for call and stated she would reach out to SW if needed.  SW gave contact information for any further assistance.    ALEJANDRA Gramajo  "

## 2022-04-06 NOTE — NURSING NOTE
Lower abdominal/bilateral sides of groin area, cramping, constant, 6/10 with Midol this a.m. Once Midol takes effect pain around 2/10. No further questions today.

## 2022-04-11 NOTE — PROGRESS NOTES
[facility]  DEPARTMENT OF RADIATION ONCOLOGY   ON TREATMENT VISIT (OTV) NOTE    Name: Jeannette Johnston MRN: [id number]   : 1940 (81 year old)  Date of Service: 2022 Referring: Dr. Sanchez     Diagnosis and Cancer Staging  Malignant neoplasm of overlapping sites of bladder (H)  Staging form: Urinary Bladder, AJCC 8th Edition  - Clinical: Stage IIIA (cT3, cN0, cM0) - Signed by Suraj Moran MD on 3/4/2022      Radiation Therapy   Site: Bladder and pelvic nodes using volumetric-modulated arc therapy (VMAT) with sequential boost.   Treatment to Date    Received 21 fraction(s) = 4200 cGy (at 200 each)    Remaining 0 + 4 + 7 fraction(s)    Goal 32 fractions (20 + 5 + 7) = 6400 cGy (4000 + 1000 + 1400)     Concurrent chemoradiation week 0 (fractions 1-5): Grade 0 toxicity due to radiation therapy (CTCAE 5.0).  Week 1 (06-10): Grade 0.  Week 2 (11-15): Grade 1 fatigue.  Week 3 (16-20): Grade 1 fatigue. Improved urinary function.  Week 4 (21-25): Grade 1 fatigue (nausea after chemotherapy).  Week 5 (26-30):   Week 6 (32):     Summary   (Please note that EMR interfaces between institutions can result in loss of embedded images).      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 In the setting of medically inoperable/surgically unresectable disease, medical oncology referred the patient for concurrent chemoradiation thereapy with potentially curative intent through bladder-sparing therapy (Primary:  7.1-cm disease, the bulk of which clinically extends beyond the bladder. Overlies left ureterovesical junction. Nodes: cN0 per CT. Metastasis: cM0 per CT. Markers: None applicable). Among the additional co-morbidities and social determinants affecting toxicity risk are chronic bilateral lower medial groin pain (presenting complaint), poor bladder function (incontinence, wet sheets, adult disposable undergarments),  pubovaginal sling, and advanced age..  02/04/2022 CT      Recent History (CE)   General: Accompanied by son. Seen before treatment due to GI as below. Otherwise feels well. Reports that she feels that she has no toxicity from radiation therapy. Finds setup and treatment to be easy. No bowel or bladder preparation prior to daily radiation therapy.  Chemotherapy: Concurrent cytotoxic chemotherapy.  Regimen:  Twice weekly gemcitabine (C1D1 03/14/2022).  Most recent:  04/07/2022.  Next dose:  04/11/2022  Labs: Reviewed from 04/04/2022. Adequate to continue therapy.  Growth factors: None.  Scheduled supplemental IV fluids: None.  GI: Complains of significant nausea and vomiting since last Thursday's chemotherapy. Reports having received a new antimedic during the regimen. Reports not having an antiemetic at home-has received no antiemetic therapy since leaving her infusion center on Thursday. Little per oral intake of food or liquids. Feeling more fatigued. Otherwise, stable at baseline (occasional constipation versus loose stools). No diarrhea, cramping, or blood on stools.   Index: Grade 0 due to radiation therapy.  Intervention: We will notify medical oncology of referral to service for evaluation of antiemetic regimen for chemotherapy. Continue Dulcolax versus Imodium as needed (historical preference).   Impression: Ongoing bowel habits represent patient's historical based rather than treatment-related toxicity.  Pain: Stable pain syndrome. Previously clarified that the pain is located at the bilateral lower medial groin. Began on the left side at the time of presentation. Then started on the right side. Similar to menses pain (but anatomically extrapelvic). Remains 2/10 with Midol as needed (previously 6/10). Reaches 6/10 without Midol. Patient satisfied with pain regimen.  Index: Grade 0 due to radiation therapy.  Intervention: Continue Midol as needed, typically 2-3 times per day (additional acetaminophen  "previously discontinued).  Impression: Pain due to bladder cancer and present prior to the start of radiation therapy.  Fatigue: Reports more fatigue over the weekend. Attributes to poor poor oral intake. Prior to treatment, was very active and dynamic. Since starting chemoradiation, has been fatigued and started napping (not before radiation therapy).  Index: Grade 1.  Intervention: Observe.   Impression: Patient known as \"Zippy\" and usually very active. Remains active but nap is atypical for her baseline.  :  Reports 1 week history of improved urination. Can now sometimes control initiation of void on the toilet. Otherwise, stable at compromised baseline (incontinent day/night, adult disposable undergarments, leaks through undergarments at night, wet sheets). Denies hematuria or dysuria. No foul smelling or clouded urine.  Index: Grade 0 due to radiation therapy.  Intervention: Continue disposable undergarment. Use chux pads or towels at night as needed.  Impression: Do not anticipate significant improvement of bladder function after completion of chemoradiation (due to long history of bladder dysfunction and degree of bladder wall destruction by cancer).  Gyn:        No complaints. Denies vaginal discharge or passage of urine through vagina.  Index: Grade 0.  Intervention: Observe.   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 Previously counseled about posttreatment use of vaginal dilator (in lieu of sexual activity) to maintain pliability to permit formal gynecologic evaluation.  ID: Daily COVID-19 screening negative for barriers to proceeding with radiation therapy.  Follow-up: After radiation therapy, refer back to medical oncology and urology for clinical and radiographic surveillance. Anticipate possible pelvic CT approximately 1 month after the end of radiation therapy to assess treatment response and the role of cystoscopy for surveillance if radiographically without evidence of significant " disease.    ROS (score of 0 indicates that symptom is at baseline for most sections below)   See Flowsheet for additional comments as indicated.  Mild Pain (2) due to bilateral lower groins.                               Physical Examination   Vitals: Wt 44 kg (97 lb)   BMI 18.03 kg/m    Wt Readings from Last 3 Encounters:   04/11/22 44 kg (97 lb)   04/06/22 45.4 kg (100 lb 1.6 oz)   04/06/22 45.4 kg (100 lb 1.6 oz)     Clinical Examination (during the week, additional physician evaluations at [facility] with the staff):  KPS: 70 (cares for self but unable to carry on normal activity or do active work).  General: Appears slightly more fatigued but nevertheless bright, dynamic, engaging. Appears clinically stable. Appears in excellent general health. Appears non-obese. Appears rested. Appears stated age. Appears well-developed, well-nourished, and in no acute distress. Does not appear acutely or chronically ill. Appears well groomed.  Head: No alopecia. Normocephalic.  Eyes: Glasses. Anicteric. Vision intact.  ENT: Good volume. No hoarseness.  Neck: No jugulovenous distension.  Lymphatics: Deferred.  Chest/Breasts: No port. No implanted cardiac device.   Lungs: Easy respirations, no use of accessory muscles. Clear to auscultation.  Cardiovascular: No jugulovenous distension. No carotid pulsations. RRR, S1, S2.  Abdomen: No erythema. Nondistended, nontender. Soft. Bowel sounds positive.  Pelvis: Adult disposable undergarment. No erythema. Nondistended, nontender. Soft. Bowel sounds positive.  MSK: Shoulder range of motion is good. No arm edema or hand edema.   Integument: No jaundice or pallor.  Neurologic: Right-handed. Alert, oriented, fluent. Memory intact. Motor intact. Sensory intact. No ataxia.  Psychologic: Well-spoken about her cancer and therapy. Good dynamic with son Motivated, upbeat, relaxed, confident, and engaging. Pleasant, cooperative, insightful, and concrete.    Physician Radiation Therapy Impression    Routine tolerance to radiation therapy.    Physician Radiation Therapy Plan   No new interventions. Boost/cone down ongoing. Have reviewed graphical 3D plan with the patient and her son. Reviewed anticipated time course, nature, and potential interventions for managing toxicity during and after radiation therapy. Patient aware of onsite and telemedicine coverage of our clinic. She wished to proceed with treatment. All questions answered to the satisfaction of the patient and her son.    Physician Radiation Therapy Weekly Review   Reviewed available labs and diagnostic studies. Interpreted as adequate to proceed with radiation therapy. Discussed management with Therapy, Treatment Planning, and Nursing. Reviewed weekly routine QA, linac imaging, and clinical set up are adequate to proceed with radiation therapy as prescribed.    Allergies, Medications   Allergies/Reactions: Patient has no known allergies.    Outpatient Encounter Medications as of 4/11/2022   Medication Sig Dispense Refill     acetaminophen-caff-pyrilamine (MIDOL MENSTRUAL) 500-60-15 MG TABS per tablet Take 2 tablets by mouth 3 times daily Some days is taking three times per day and some 2 times per day       biotin 1000 MCG TABS tablet Take 1,000 mcg by mouth daily       omeprazole (PRILOSEC) 40 MG DR capsule Take 40 mg by mouth daily Take 1 Capsule by mouth two times a day. Do not crush.  Take 20 mins before meals       oxybutynin ER (DITROPAN-XL) 5 MG 24 hr tablet Take 5 mg by mouth daily Take 1 Tablet by mouth one time a day. Do not crush.       predniSONE (DELTASONE) 1 MG tablet Take 1 mg by mouth daily TAKE 1 TAB BY MOUTH ONE TIME A DAY. TAKE WITH 5 MG TAB FOR TOTAL DOSE 6 MG PER DAY. TAKE WITH FOOD.       predniSONE (DELTASONE) 5 MG tablet Take 5 mg by mouth daily TAKE 1 TAB BY MOUTH ONE TIME A DAY. TAKE WITH 5 MG TAB FOR TOTAL DOSE 6 MG PER DAY. TAKE WITH FOOD.       Vitamin D, Cholecalciferol, 25 MCG (1000 UT) CAPS Take 2,000 Units by mouth  daily       No facility-administered encounter medications on file as of 4/11/2022.          Suraj Moran MD, PhD  Department of Radiation Oncology  Tel: [phone]  Fax: [phone]

## 2022-04-12 NOTE — PROGRESS NOTES
[facility]  DEPARTMENT OF RADIATION ONCOLOGY   ON TREATMENT VISIT (OTV) NOTE    Name: Jeannette Johnston MRN: [id number]   : 1940 (81 year old)  Date of Service: 2022 Referring: Dr. Sanchez     Diagnosis and Cancer Staging  Malignant neoplasm of overlapping sites of bladder (H)  Staging form: Urinary Bladder, AJCC 8th Edition  - Clinical: Stage IIIA (cT3, cN0, cM0) - Signed by Suraj Moran MD on 3/4/2022      Radiation Therapy   Site: Bladder and pelvic nodes using volumetric-modulated arc therapy (VMAT) with sequential boost.   Treatment to Date    Received 23 fraction(s) = 4600 cGy (at 200 each)    Remaining 0 + 2 + 7 fraction(s)    Goal 32 fractions (20 + 5 + 7) = 6400 cGy (4000 + 1000 + 1400)     Concurrent chemoradiation week 0 (fractions 1-5): Grade 0 toxicity due to radiation therapy (CTCAE 5.0).  Week 1 (06-10): Grade 0.  Week 2 (11-15): Grade 1 fatigue.  Week 3 (16-20): Grade 1 fatigue. Improved urinary function.  Week 4 (21-25): Grade 1 fatigue, grade 2 diarrhea (easily managed with daily Imodium. Zofran for nausea after chemotherapy.  Week 5 (26-30):   Week 6 (32):     Summary    The patient is a 81 year old right-handed female who is actively treated (chemoradiation) for muscle-invasive bladder cancer. In the setting of medically inoperable/surgically unresectable disease, medical oncology referred the patient for concurrent chemoradiation thereapy with potentially curative intent through bladder-sparing therapy (Primary:  7.1-cm disease, the bulk of which clinically extends beyond the bladder. Overlies left ureterovesical junction. Nodes: cN0 per CT. Metastasis: cM0 per CT. Markers: None applicable). Among the additional co-morbidities and social determinants affecting toxicity risk are chronic bilateral lower medial groin pain (presenting complaint), poor bladder function (incontinence, wet sheets, adult disposable undergarments), pubovaginal sling, and advanced  age..  02/04/2022 CT    (Please note that EMR interfaces between institutions can result in loss of embedded images.)    Recent History (CE)   General: Accompanied by son. Seen before, during, and after treatment in clinic). Spectrum of changes as below. Still finds set-up and treatment to be easy.No bowel or bladder preparation prior to daily radiation therapy.  Chemotherapy: Concurrent cytotoxic chemotherapy.  Regimen:  Twice weekly gemcitabine (C1D1 03/14/2022).  Most recent:  04/11/2022.  Next dose:  04/14/2022  Labs: Reviewed from 04/11/2022. Adequate to continue therapy.  Growth factors: None.  Scheduled supplemental IV fluids: None.  Fatigue, malaise, sinus congestion, insomnia Reports a several day history of poor sleep due to increasing sensation of nasal/sinus congestion with a dry cough. Not aware of postnasal drip. Also experiencing metallic taste that interferes with eating. Notes increased xerostomia that has led the patient to carry a bag of ice chips for moisturization. Son has similar complaint of nasal congestion and cough. Reports that both experience it yearly. Son will see primary care practitioner on Friday. Both deny fevers or chills. Aside from medical visits, both have isolated themselves and thus avoided external contact due to the ongoing COVID-19 pandemic.  Index: Grade 0 due to radiation therapy.  Intervention: Start Zyrtec or Allegra today for possible yearly seasonal allergy (symptoms have started in the community). Home test today for COVID-19 due to character of spectrum of complaints above the diaphragm and systemic complaints that are atypical for pelvic chemoradiation. Son will see primary care physician on Friday for assessment of similar complaints. Counseled son and patient about precautions of COVID-19 test is positive. Due to the patient's complaints today (Wednesday), would likely defer additional chemoradiation until the end of the upcoming holiday weekend, Monday  "04/18/2022.  Impression: As above.  GI, FEN: Nausea improved after starting Zofran yesterday as prescribed by medical oncology. Daily loose stool continues to be controlled with Imodium since 04/07/2022 through medical oncology. Weight stable despite report of diminished per oral intake due to malaise. Denies vomiting, cramping, or blood on stools. (Baseline: occasional constipation versus loose stools).   Index: Grade 2 due to concurrent chemoradiation.   Intervention: Continue Imodium as needed for diarrhea and Zofran as needed for nausea. Continue Dulcolax versus Imodium as needed for historical alternating bowel habits (patient's preferred historical regimen).   Impression: Easily managed and tolerated bowel habit changes that are superimposed historical baseline of compromise.   Pain: Stable pain syndrome. Had reviously clarified that the pain is located at the bilateral lower medial groin. Began on the left side at the time of presentation. Then started on the right side. Similar to menses pain (but anatomically extrapelvic). Remains 2/10 with Midol as needed (previously 6/10). Reaches 6/10 without Midol. Patient satisfied with pain regimen.  Index: Grade 0 due to radiation therapy.  Intervention: Continue Midol as needed, typically 2-3 times per day (additional acetaminophen previously discontinued).  Impression: Pain due to bladder cancer and present prior to the start of radiation therapy.  Fatigue: As above. Previously had attributeed to poor poor oral intake. Prior to treatment, was very active and dynamic. Since starting chemoradiation, has been fatigued and started napping (not before radiation therapy).  Index: Grade 1.  Intervention: Observe.   Impression: Patient known as \"Zippy\" and usually very active. Remains active but nap is atypical for her baseline.  :  Reports 2 week history of improved urination. Can now sometimes control initiation of void on the toilet. Otherwise, stable at compromised " baseline (incontinent day/night, adult disposable undergarments, leaks through undergarments at night, wet sheets). Denies hematuria or dysuria. No foul smelling or clouded urine.  Index: Grade 0 due to radiation therapy.  Intervention: Continue disposable undergarment. Use chux pads or towels at night as needed.  Impression: Do not anticipate significant improvement of bladder function after completion of chemoradiation (due to long history of bladder dysfunction and degree of bladder wall destruction by cancer).  Gyn:        No complaints. Denies vaginal discharge or passage of urine through vagina.  Index: Grade 0.  Intervention: Observe.   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 Previously counseled about posttreatment use of vaginal dilator (in lieu of sexual activity) to maintain pliability to permit formal gynecologic evaluation.  ID: As above. Otherwise, daily COVID-19 screening negative for barriers to proceeding with radiation therapy.  Follow-up: After radiation therapy, refer back to medical oncology and urology for clinical and radiographic surveillance. Anticipate possible pelvic CT approximately 1 month after the end of radiation therapy to assess treatment response and the role of cystoscopy for surveillance if radiographically without evidence of significant disease.    ROS (score of 0 indicates that symptom is at baseline for most sections below)   See Flowsheet for additional comments as indicated.  No Pain (0) due to bilateral lower groins.                               Physical Examination   Vitals: Wt 45 kg (99 lb 1.6 oz)   BMI 18.42 kg/m    Wt Readings from Last 3 Encounters:   04/13/22 45 kg (99 lb 1.6 oz)   04/11/22 44 kg (97 lb)   04/06/22 45.4 kg (100 lb 1.6 oz)     Clinical Examination (during the week, additional physician evaluations at [facility] with the staff):  KPS: 70 (cares for self but unable to carry on normal activity or do active work).  General: Appears more  fatigued. Appears slightly worn. No longer bright, dynamic, or engaging. Does not appear acutely or chronically ill. Appears clinically stable. Appears in good general health. Appears non-obese. Appears moderately fatigued. Appears stated age. Appears well-developed, well-nourished, and in no acute distress. Appears well groomed.  Head: No alopecia. Normocephalic.  Eyes: Glasses. Anicteric. Vision intact.  ENT: Good volume. No hoarseness.  Neck: No jugulovenous distension.  Lymphatics: No cervical or supraclavicular adenopathy.  Chest/Breasts: No port. No implanted cardiac device.   Lungs: Easy respirations, no use of accessory muscles. Clear to auscultation.  Cardiovascular: No jugulovenous distension. No carotid pulsations. RRR, S1, S2.  Abdomen: No erythema. Nondistended, nontender. Soft. Bowel sounds positive.  Pelvis: Adult disposable undergarment. No erythema. Nondistended, nontender. Soft. Bowel sounds positive.  MSK: Shoulder range of motion is good. No arm edema or hand edema.   Integument: No jaundice or pallor.  Neurologic: Right-handed. Alert, oriented, fluent. Memory intact. Motor intact. Sensory intact. No ataxia.  Psychologic: Well-spoken about her cancer and therapy. Good dynamic with son. Slightly frustrated about her fatigue and malaise. Motivated, relaxed, and confident. Pleasant, cooperative, insightful, and concrete.    Physician Radiation Therapy Impression   Routine tolerance to radiation therapy.    Physician Radiation Therapy Plan   No new interventions. Boost/cone down ongoing. Have reviewed graphical 3D plan with the patient and her son. Reviewed anticipated time course, nature, and potential interventions for managing toxicity during and after radiation therapy. Patient aware of onsite and telemedicine coverage of our clinic. She wished to proceed with treatment. All questions answered to the satisfaction of the patient and her son.    Physician Radiation Therapy Weekly Review   Reviewed  available labs and diagnostic studies. Interpreted as adequate to proceed with radiation therapy. Discussed management with Therapy, Treatment Planning, and Nursing. Reviewed weekly routine QA, linac imaging, and clinical set up are adequate to proceed with radiation therapy as prescribed.    Allergies, Medications   Allergies/Reactions: Patient has no known allergies.    Outpatient Encounter Medications as of 4/13/2022   Medication Sig Dispense Refill     cetirizine (ZYRTEC) 10 MG tablet Take 10 mg by mouth daily       loperamide (IMODIUM) 2 MG capsule Take 2 mg by mouth 4 times daily as needed for diarrhea       ondansetron (ZOFRAN) 8 MG tablet Take 8 mg by mouth 3 times daily as needed       acetaminophen-caff-pyrilamine (MIDOL MENSTRUAL) 500-60-15 MG TABS per tablet Take 2 tablets by mouth 3 times daily Some days is taking three times per day and some 2 times per day       biotin 1000 MCG TABS tablet Take 1,000 mcg by mouth daily       omeprazole (PRILOSEC) 40 MG DR capsule Take 40 mg by mouth daily Take 1 Capsule by mouth two times a day. Do not crush.  Take 20 mins before meals       oxybutynin ER (DITROPAN-XL) 5 MG 24 hr tablet Take 5 mg by mouth daily Take 1 Tablet by mouth one time a day. Do not crush.       predniSONE (DELTASONE) 1 MG tablet Take 1 mg by mouth daily TAKE 1 TAB BY MOUTH ONE TIME A DAY. TAKE WITH 5 MG TAB FOR TOTAL DOSE 6 MG PER DAY. TAKE WITH FOOD.       predniSONE (DELTASONE) 5 MG tablet Take 5 mg by mouth daily TAKE 1 TAB BY MOUTH ONE TIME A DAY. TAKE WITH 5 MG TAB FOR TOTAL DOSE 6 MG PER DAY. TAKE WITH FOOD.       Vitamin D, Cholecalciferol, 25 MCG (1000 UT) CAPS Take 2,000 Units by mouth daily       No facility-administered encounter medications on file as of 4/13/2022.        Suraj Moran MD, PhD  Department of Radiation Oncology  Tel: [phone]  Fax: [phone]

## 2022-04-12 NOTE — TELEPHONE ENCOUNTER
Spoke with Bharti at [facility] with,  [employer]. Regarding Jeannette's symptoms of nausea and vomiting since receiving Chemotherapy last Thursday. Request for an antiemetic, patient request prescription be sent to [facility].  To reach patient call son's phone, Naldo at [phone].  Bharti at [facility] is aware and will contact patient.

## 2022-04-13 NOTE — PROGRESS NOTES
Radiation Oncology - Clinic Note (4:15 PM, 5:58 PM)      To follow-up on today's anticipated COVID-19 testing, multiple attempts made throughout the afternoon and early evening to reach the patient or her son. Messages left on voicemail (home, cell) with my contact information for patient and her son. Alternative number for the patient was her daughter's cell. Daughter had stopped by the home, but the door was locked, presumably because the patient and her son were napping. Reviewed the patient's current status, possibility of COVID-19 infection versus seasonal allergies versus systemic toxicities of concurrent chemoradiation. Reviewed the patient's changes over the last week. Nevertheless, is tolerating treatment relatively well. Daughter appreciated extended discussion and update. All questions answered to her satisfaction.    ADDENDUM 04/13/2022 (7:21 PM): I spoke to the patient by phone at home. She reported that she felt dramatically better after a long nap. She and her son therefore elected not to use their home COVID-19 test kit. The patient will therefore return to clinic tomorrow to continue chemoradiation for her bladder cancer.      Suraj Moran MD, PhD  Department of Radiation Oncology  Tel: [phone]  Fax: [phone]

## 2022-04-13 NOTE — TELEPHONE ENCOUNTER
Called patient to follow up with home covid test results. Patients son answered and stated that she is asleep and that they have not taken a test yet. He is okay with a phone call this afternoon to see if they were able to complete the test. Will call patient back this afternoon

## 2022-04-19 NOTE — PROGRESS NOTES
[facility]  DEPARTMENT OF RADIATION ONCOLOGY   ON TREATMENT VISIT (OTV) NOTE    Name: Jeannette Johnston MRN: [id number]   : 1940 (81 year old)  Date of Service: 2022 Referring: Dr. Sanchez     Diagnosis and Cancer Staging  Malignant neoplasm of overlapping sites of bladder (H)  Staging form: Urinary Bladder, AJCC 8th Edition  - Clinical: Stage IIIA (cT3, cN0, cM0) - Signed by Suraj Moran MD on 3/4/2022      Radiation Therapy   Site: Bladder and pelvic nodes using volumetric-modulated arc therapy (VMAT) with sequential boost.   Treatment to Date    Received 27 fraction(s) = 5400 cGy (at 200 each)    Remaining 0 + 0 + 5 fraction(s)    Goal 32 fractions (20 + 5 + 7) = 6400 cGy (4000 + 1000 + 1400)     Concurrent chemoradiation week 0 (fractions 1-5): Grade 0 toxicity due to radiation therapy (CTCAE 5.0).  Week 1 (-10): Grade 0.  Week 2 (11-15): Grade 1 fatigue.  Week 3 (16-20): Grade 1 fatigue. Improved urinary function.  Week 4 (21-25): Grade 2 fatigue, grade 2 diarrhea (easily managed with daily Imodium. Compazine or Zofran for nausea after chemotherapy).  Week 5 (26-30): Grade 1 fatigue, grade 2 diarrhea (easily managed with daily Imodium). (one Compazine for nausea after chemotherapy. Did not use Zofran.)  Week 6 (32):     Summary    The patient is a 81 year old right-handed female who is actively treated (chemoradiation) for muscle-invasive bladder cancer. In the setting of medically inoperable/surgically unresectable disease, medical oncology referred the patient for concurrent chemoradiation thereapy with potentially curative intent through bladder-sparing therapy (Primary:  7.1-cm disease, the bulk of which clinically extends beyond the bladder. Overlies left ureterovesical junction. Nodes: cN0 per CT. Metastasis: cM0 per CT. Markers: None applicable). Among the additional co-morbidities and social determinants affecting toxicity risk are chronic bilateral lower medial  "groin pain (presenting complaint), poor bladder function (incontinence, wet sheets, adult disposable undergarments), pubovaginal sling, and advanced age..  02/04/2022 CT    (Please note that EMR interfaces between institutions can result in loss of embedded images.)    Recent History (CE)   General: Accompanied by by son and LPN. Clarified that they have been referring to medical oncology as the primary care service (saw medical oncology yesterday; have not seen a primary care provider since the start of chemoradiation). Feeling better but pelvic pain as below. Offers no new types of complaints. Still finds set-up and treatment to be easy.No bowel or bladder preparation prior to daily radiation therapy.  Chemotherapy: Concurrent cytotoxic chemotherapy.  Regimen:  Twice weekly gemcitabine (C1D1 03/14/2022).  Most recent:  04/18//2022.  Next dose:  04/22/2022  Labs: Reviewed from 04/11/2022. Adequate to continue therapy.  Growth factors: None.  Scheduled supplemental IV fluids: None.  Pain: Reports increased \"stomach\" pain (points to suprapubic region below the umbilicus). Increased after stopping Midol. Rates as 5/10. Still intermittent. When asked why she stopped, patient offered specific reason or rationale. Has not been taking acetaminophen as a substitute for MIdol. (previously clarified that the pain is located at the bilateral lower medial groin. Began on the left side at the time of presentation. Then started on the right side. Similar to menses pain (but anatomically extrapelvic. Was 2/10 with Midol as needed (previously 6/10). Reaches 6/10 without Midol. Patient had been satisfied with pain regimen.)  Index: Grade 0 due to radiation therapy.  Intervention: Resume Midol as needed, typically 2-3 times per day. Counseled against additional acetaminophen.  Impression: Pain due to bladder cancer and present prior to the start of radiation therapy. Continued to decrease after stopping Midol. Therefore, currently " "not due to radiation therapy.  Fatigue, malaise, sinus congestion, insomnia: Although still fatigue, patient and son acknowledge much less fatigue this week. No specific intervention. Has been eating more since expanding her diet. Still avoids meats and some other foods due to dysgeusia (metallic taste). Less nasal/sinus congestion and cough, and thus sleeping better. Increased xerostomia still requires bag of ice chips for moisturization. Patient and son deny fevers or chills. Aside from medical visits, both have isolated themselves and thus avoided external contact due to the ongoing COVID-19 pandemic. Testing not requested by other services.  Index: Grade 0 due to radiation therapy.  Intervention: Observe.   Impression: Fatigue likely multifactoral and related to chemoradiation. Congestion etiology unclear but likely due to seasonal allergies based on history..  GI, FEN: Took Compazine only once due to improved nausea. Has not been taking Zofran. Daily loose stool continues to be controlled with Imodium since 04/07/2022 through medical oncology. Weight slightly lower despite increased per oral intake. Denies vomiting, cramping, or blood on stools. (Baseline: occasional constipation versus loose stools).   Index: Grade 2 due to concurrent chemoradiation.   Intervention: Recommended further advancement of diet including high-caloric foods as tolerated. Prefers ice chips over electrolyte fluids to relieve \"leather\" feeling of dryness of the mouth. Continue Imodium as needed for diarrhea. Continue Compazine or Zofran as needed for nausea. Continue Dulcolax versus Imodium as needed for historical alternating bowel habits (patient's preferred historical regimen).   Impression: Improved overall.  Fatigue: As above. Previously had attributeed to poor poor oral intake. Prior to treatment, was very active and dynamic. Since starting chemoradiation, has been fatigued and started napping (not before radiation " "therapy).  Index: Grade 1.  Intervention: Observe.   Impression: Patient known as \"Zippy\" and usually very active. Remains active but nap is atypical for her baseline.  :  Reports 3 week history of improved urination. Can now sometimes control initiation of void on the toilet. Otherwise, stable at compromised baseline (incontinent day/night, adult disposable undergarments, leaks through undergarments at night, wet sheets). Denies hematuria or dysuria. No foul smelling or clouded urine.  Index: Grade 0 due to radiation therapy.  Intervention: Continue disposable undergarment. Use chux pads or towels at night as needed.  Impression: Do not anticipate significant improvement of bladder function after completion of chemoradiation (due to long history of bladder dysfunction and degree of bladder wall destruction by cancer).  Gyn:        No complaints. Denies vaginal discharge or passage of urine through vagina.  Index: Grade 0.  Intervention: Observe.   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 Previously counseled about posttreatment use of vaginal dilator (in lieu of sexual activity) to maintain pliability to permit formal gynecologic evaluation.  ID: As above. Otherwise, daily COVID-19 screening negative for barriers to proceeding with radiation therapy.  Follow-up: After radiation therapy, refer back to medical oncology and urology for clinical and radiographic surveillance. Anticipate possible pelvic CT approximately 1 month after the end of radiation therapy to assess treatment response and the role of cystoscopy for surveillance if radiographically without evidence of significant disease.    ROS (score of 0 indicates that symptom is at baseline for most sections below)   See Flowsheet for additional comments as indicated.  Moderate Pain (5) due to suprapubic region crampy pain.                               Physical Examination   Vitals: Wt 43.9 kg (96 lb 12.8 oz)   BMI 18.00 kg/m    Wt Readings from " Last 3 Encounters:   04/20/22 43.9 kg (96 lb 12.8 oz)   04/13/22 45 kg (99 lb 1.6 oz)   04/11/22 44 kg (97 lb)     Clinical Examination (during the week, additional physician evaluations at linac with the staff):  KPS: 70 (cares for self but unable to carry on normal activity or do active work).  General: Appears much brighter, rested, and more well than last week. Much more talkative. Nevertheless, still appears mildly fatigue. =Does not appear acutely or chronically ill. Appears clinically stable. Appears in good general health. Appears non-obese. Appears moderately fatigued. Appears stated age. Appears well-developed, well-nourished, and in no acute distress. Appears well groomed.  Head: No alopecia. Normocephalic.  Eyes: Glasses. Anicteric. Vision intact.  ENT: Good volume. No hoarseness.  Neck: No jugulovenous distension.  Lymphatics: No cervical or supraclavicular adenopathy.  Chest/Breasts: No port. No implanted cardiac device.   Lungs: Easy respirations, no use of accessory muscles. Clear to auscultation.  Cardiovascular: No jugulovenous distension. No carotid pulsations. RRR, S1, S2.  Abdomen: No erythema. Nondistended, nontender. Soft. Bowel sounds positive.  Pelvis: Adult disposable undergarment. No erythema. Nondistended, nontender. Soft. Bowel sounds positive.  MSK: No cords or calf tenderness. Shoulder range of motion is good. No arm edema or hand edema.   Integument: No jaundice or pallor.  Neurologic: Right-handed. Alert, oriented, fluent. Memory intact. Motor intact. Sensory intact. No ataxia.  Psychologic: Well-spoken about her cancer and therapy. Good dynamic with son. Less frustrated about fatigue and malaise. Motivated, relaxed, and confident. Pleasant, cooperative, insightful, and concrete.    Physician Radiation Therapy Impression   Routine tolerance to radiation therapy.    Physician Radiation Therapy Plan   No new interventions. Boost/cone down ongoing. Have reviewed graphical 3D plan with  the patient and her son. Reviewed anticipated time course, nature, and potential interventions for managing toxicity during and after radiation therapy. Patient aware of onsite and telemedicine coverage of our clinic. She wished to proceed with treatment. All questions answered to the satisfaction of the patient and her son.    Physician Radiation Therapy Weekly Review   Reviewed available labs and diagnostic studies. Interpreted as adequate to proceed with radiation therapy. Discussed management with Therapy, Treatment Planning, and Nursing. Reviewed weekly routine QA, linac imaging, and clinical set up are adequate to proceed with radiation therapy as prescribed.    Allergies, Medications   Allergies/Reactions: Patient has no known allergies.    Outpatient Encounter Medications as of 4/20/2022   Medication Sig Dispense Refill     acetaminophen-caff-pyrilamine (MIDOL MENSTRUAL) 500-60-15 MG TABS per tablet Take 2 tablets by mouth 3 times daily Some days is taking three times per day and some 2 times per day       cetirizine (ZYRTEC) 10 MG tablet Take 10 mg by mouth daily       loperamide (IMODIUM) 2 MG capsule Take 2 mg by mouth 4 times daily as needed for diarrhea       omeprazole (PRILOSEC) 40 MG DR capsule Take 40 mg by mouth daily Take 1 Capsule by mouth two times a day. Do not crush.  Take 20 mins before meals       ondansetron (ZOFRAN) 8 MG tablet Take 8 mg by mouth 3 times daily as needed       oxybutynin ER (DITROPAN-XL) 5 MG 24 hr tablet Take 5 mg by mouth daily Take 1 Tablet by mouth one time a day. Do not crush.       prochlorperazine (COMPAZINE) 10 MG tablet Take 10 mg by mouth       predniSONE (DELTASONE) 1 MG tablet Take 1 mg by mouth daily TAKE 1 TAB BY MOUTH ONE TIME A DAY. TAKE WITH 5 MG TAB FOR TOTAL DOSE 6 MG PER DAY. TAKE WITH FOOD. (Patient not taking: Reported on 4/20/2022)       predniSONE (DELTASONE) 5 MG tablet Take 5 mg by mouth daily TAKE 1 TAB BY MOUTH ONE TIME A DAY. TAKE WITH 5 MG TAB  FOR TOTAL DOSE 6 MG PER DAY. TAKE WITH FOOD. (Patient not taking: Reported on 4/20/2022)       [DISCONTINUED] biotin 1000 MCG TABS tablet Take 1,000 mcg by mouth daily       [DISCONTINUED] Vitamin D, Cholecalciferol, 25 MCG (1000 UT) CAPS Take 2,000 Units by mouth daily       No facility-administered encounter medications on file as of 4/20/2022.        Suraj Moran MD, PhD  Department of Radiation Oncology  Tel: [phone]  Fax: [phone]    Care Team:  Keith Sanchez M.D., Ph.D. (medical oncology)  Calvin Davey M.D., Ph.D. (urology)  Jos Dee III, M.D. (medicine)

## 2022-04-27 NOTE — PROGRESS NOTES
[facility]  DEPARTMENT OF RADIATION ONCOLOGY   ON TREATMENT VISIT (OTV) NOTE    Name: Jeannette Johnston MRN: [id number]   : 1940 (81 year old)  Date of Service: 2022 Referring: Dr. Sanchez     Diagnosis and Cancer Staging  Malignant neoplasm of overlapping sites of bladder (H)  Staging form: Urinary Bladder, AJCC 8th Edition  - Clinical: Stage IIIA (cT3, cN0, cM0) - Signed by Suraj Moran MD on 3/4/2022      Radiation Therapy   Site: Bladder and pelvic nodes using volumetric-modulated arc therapy (VMAT) with sequential boost.   Treatment to Date    Received 32 fraction(s) = 6400 cGy (at 200 each)    Remaining 0 + 0 + 0 fraction(s)    Goal 32 fractions (20 + 5 + 7) = 6400 cGy (4000 + 1000 + 1400)     Concurrent chemoradiation week 0 (fractions 1-5): Grade 0 toxicity due to radiation therapy (CTCAE 5.0).  Week 1 (-10): Grade 0.  Week 2 (11-15): Grade 1 fatigue.  Week 3 (16-20): Grade 1 fatigue. Improved urinary function.  Week 4 (21-25): Grade 2 fatigue, grade 2 diarrhea (easily managed with daily Imodium. Compazine or Zofran for nausea after chemotherapy).  Week 5 (26-30): Grade 1 fatigue, grade 2 diarrhea (easily managed with daily Imodium). (one Compazine for nausea after chemotherapy. Did not use Zofran.)  Week 6 (32): Grade 2 fatigue. Prior diarrhea and pelvic pain not due to radiation therapy (improved despite stopping symptomatic medications and despite continuation of radiation therapy; throat complaints were outside the field of radiation therapy).    Summary    The patient is a 81 year old right-handed female who is actively treated (chemoradiation) for muscle-invasive bladder cancer. In the setting of medically inoperable/surgically unresectable disease, medical oncology referred the patient for concurrent chemoradiation thereapy with potentially curative intent through bladder-sparing therapy (Primary:  7.1-cm disease, the bulk of which clinically extends beyond the  "bladder. Overlies left ureterovesical junction. Nodes: cN0 per CT. Metastasis: cM0 per CT. Markers: None applicable). Among the additional co-morbidities and social determinants affecting toxicity risk are chronic bilateral lower medial groin pain (presenting complaint), poor bladder function (incontinence, wet sheets, adult disposable undergarments), pubovaginal sling, and advanced age..  02/04/2022 CT    (Please note that EMR interfaces between institutions can result in loss of embedded images.)    Recent History (CE)   General: Accompanied by son, LPN, and care coordinator. Done today. Fatigue, eating, and throat as below. Offers no new types of complaints. Still finds set-up and treatment to be easy.No bowel or bladder preparation prior to daily radiation therapy.  Chemotherapy: Concurrent cytotoxic chemotherapy.  Regimen:  Twice weekly gemcitabine (C1D1 03/14/2022).  Most recent:  04/25//2022.  Next dose:  None.  Labs: Reviewed from 04/25/2022. Adequate to continue therapy.  Growth factors: None.  Scheduled supplemental IV fluids: None.  Fatigue: Reports feeling more fatigued. Napping, worn.   Index: Grade 2.  Intervention: Observe.   Impression: Patient known as \"Zippy\" and usually very active.  GI, FEN: Eating less, partly due to continued lower throat discomfort and thick \"phlegm\" that remain a barrier to swallowing. Had once \"choked\" in the distant past. Has taken Compazine only once due to improved nausea without Compazine (has not been taking Zofran). Diarrhea remains much improved. Had only one episode in the last 2 weeks and has taken Imodium only once in the last 2 weeks. (previously, daily loose stool was controlled with Imodium since 04/07/2022 through medical oncology). Denies vomiting, cramping, or blood on stools. (Baseline: occasional constipation versus loose stools).   Index: Grade 0 due to concurrent radiation therapy.   Intervention: Again recommended further advancement of diet including " "high-caloric foods as tolerated. Prefers ice chips over electrolyte fluids to relieve \"leather\" feeling of dryness of the mouth. Continue Imodium as needed for diarrhea. Continue Compazine or Zofran as needed for nausea. Continue Dulcolax versus Imodium as needed for historical alternating bowel habits (patient's preferred historical regimen). Can use Imodium if needed for diarrhea.  Impression: Improved overall. Diarrhea not due to radiation therapy since improved as radiation therapy proceed and stopped requiring Imodium.  Pain: Despite report of 5/10 pain, patient reports that she feels that the pain is much improved and is no longer better. Thus, the pain remains improved with continuation of radiation therapy. No longer taking Midol daily; not substituting acetaminophen for Midol. Uses the term \"stomach\" pain to reference the \"menstrual-like\" pain anatomically located in the suprapubic region below the umbilicus (points to the region). At times, had also referenced a pain in the lower bilateral medial groin (at the time of presentation, was on the left side and then began on the right side. Also had stated that it was similar to menses pain yet was anatomically extrapelvic. Was 2/10 with Midol as needed (previously 6/10). Had reaches 6/10 without Midol. Patient had been satisfied with pain regimen.)  Index: Grade 0 due to radiation therapy.  Intervention: Can resume Midol if needed. Counseled against additional acetaminophen.  Impression: Pain due to bladder cancer and present prior to the start of radiation therapy. Had continued to decrease after stopping Midol. Therefore, not due to radiation therapy. Likely due to primary disease.  Sinus: Malaise, sinus congestion, insomnia: Improved. Patient and son deny fevers or chills. Aside from medical visits, both have isolated themselves and thus avoided external contact due to the ongoing COVID-19 pandemic. Testing not requested by other services.  Index: Grade 0 " "due to radiation therapy.  Intervention: Observe.   Impression: Etiology unclear but likely due to seasonal allergies based on history..  :  Urination remains much improved. Able to urinate \"on demand\". Otherwise, stable at compromised baseline (incontinent day/night, adult disposable undergarments, leaks through undergarments at night, wet sheets). Denies hematuria or dysuria. No foul smelling or clouded urine.  Index: Grade 0 due to radiation therapy.  Intervention: Disposable undergarment as needed. Use chux pads or towels at night as needed.  Impression: Improved urinary function likely reflects favorable treatment response (at the start of treatment, had not anticipated significant provement of bladder function with chemoradiation (improvement was felt to be unlikely due to long history of bladder dysfunction and degree of reported bladder wall destruction by cancer).  Gyn:        No complaints. Denies vaginal discharge or passage of urine through vagina.  Index: Grade 0.  Intervention: Observe.   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 Previously counseled about posttreatment use of vaginal dilator (in lieu of sexual activity) to maintain pliability to permit formal gynecologic evaluation.  ID: As above. Otherwise, daily COVID-19 screening negative for barriers to proceeding with radiation therapy.  Follow-up: Note: During chemoradiation, frequently referred to medical oncology as the \"primary care\" service. Refer back to medical oncology and urology for clinical and radiographic surveillance. Anticipate possible pelvic CT approximately 1 month after the end of radiation therapy to assess treatment response and the role of cystoscopy for surveillance if radiographically without evidence of significant disease.    ROS (score of 0 indicates that symptom is at baseline for most sections below)   See Flowsheet for additional comments as indicated.  Moderate Pain (5) due to suprapubic region crampy " pain.                               Physical Examination   Vitals: Wt 43 kg (94 lb 12.8 oz)   BMI 17.62 kg/m    Wt Readings from Last 3 Encounters:   04/27/22 43 kg (94 lb 12.8 oz)   04/20/22 43.9 kg (96 lb 12.8 oz)   04/13/22 45 kg (99 lb 1.6 oz)     Clinical Examination (during the week, additional physician evaluations at [facility] with the staff):  KPS: 70 (cares for self but unable to carry on normal activity or do active work).  General: Appears much brighter, rested, and more well than last week. Much more talkative. Nevertheless, still appears mildly fatigue. =Does not appear acutely or chronically ill. Appears clinically stable. Appears in good general health. Appears non-obese. Appears moderately fatigued. Appears stated age. Appears well-developed, well-nourished, and in no acute distress. Appears well groomed.  Head: No alopecia. Normocephalic.  Eyes: Glasses. Anicteric. Vision intact.  ENT: Good volume. No hoarseness.  Neck: No jugulovenous distension.  Lymphatics: No cervical or supraclavicular adenopathy.  Chest/Breasts: No port. No implanted cardiac device.   Lungs: Easy respirations, no use of accessory muscles. Clear to auscultation.  Cardiovascular: No jugulovenous distension. No carotid pulsations. RRR, S1, S2.  Abdomen: No erythema. Nondistended, nontender. Soft. Bowel sounds positive.  Pelvis: No erythema. Nondistended, nontender. Soft. Bowel sounds positive. (previous note of adult disposable undergarment.)  MSK: No cords or calf tenderness. Shoulder range of motion is good. No arm edema or hand edema.   Integument: No jaundice or pallor.  Neurologic: Right-handed. Alert, oriented, fluent. Memory intact. Motor intact. Sensory intact. No ataxia.  Psychologic: Well-spoken about her cancer and therapy. Good dynamic with son. Less frustrated about fatigue and malaise. Motivated, relaxed, and confident. Pleasant, cooperative, insightful, and concrete.    Physician Radiation Therapy Impression    Routine tolerance to radiation therapy.    Physician Radiation Therapy Plan   No new interventions. Boost/cone down ongoing. Have reviewed graphical 3D plan with the patient and her son. Reviewed anticipated time course, nature, and potential interventions for managing toxicity during and after radiation therapy. Reviewed discharge and follow up plans. Patient aware of onsite and telemedicine coverage of our clinic. She wished to proceed with treatment. All questions answered to the satisfaction of the patient and her son.    Physician Radiation Therapy Weekly Review   Reviewed available labs and diagnostic studies. Interpreted as adequate to proceed with radiation therapy. Discussed management with Therapy, Treatment Planning, and Nursing. Reviewed weekly routine QA, linac imaging, and clinical set up are adequate to proceed with radiation therapy as prescribed.    Allergies, Medications   Allergies/Reactions: Patient has no known allergies.    Outpatient Encounter Medications as of 4/27/2022   Medication Sig Dispense Refill     acetaminophen-caff-pyrilamine (MIDOL MENSTRUAL) 500-60-15 MG TABS per tablet Take 2 tablets by mouth 3 times daily Some days is taking three times per day and some 2 times per day       loperamide (IMODIUM) 2 MG capsule Take 2 mg by mouth 4 times daily as needed for diarrhea       prochlorperazine (COMPAZINE) 10 MG tablet Take 10 mg by mouth       cetirizine (ZYRTEC) 10 MG tablet Take 10 mg by mouth daily (Patient not taking: Reported on 4/27/2022)       omeprazole (PRILOSEC) 40 MG DR capsule Take 40 mg by mouth daily Take 1 Capsule by mouth two times a day. Do not crush.  Take 20 mins before meals (Patient not taking: Reported on 4/27/2022)       ondansetron (ZOFRAN) 8 MG tablet Take 8 mg by mouth 3 times daily as needed (Patient not taking: Reported on 4/27/2022)       oxybutynin ER (DITROPAN-XL) 5 MG 24 hr tablet Take 5 mg by mouth daily Take 1 Tablet by mouth one time a day. Do not  crush. (Patient not taking: Reported on 4/27/2022)       predniSONE (DELTASONE) 1 MG tablet Take 1 mg by mouth daily TAKE 1 TAB BY MOUTH ONE TIME A DAY. TAKE WITH 5 MG TAB FOR TOTAL DOSE 6 MG PER DAY. TAKE WITH FOOD. (Patient not taking: No sig reported)       predniSONE (DELTASONE) 5 MG tablet Take 5 mg by mouth daily TAKE 1 TAB BY MOUTH ONE TIME A DAY. TAKE WITH 5 MG TAB FOR TOTAL DOSE 6 MG PER DAY. TAKE WITH FOOD. (Patient not taking: No sig reported)       No facility-administered encounter medications on file as of 4/27/2022.        Suraj Moran MD, PhD  Department of Radiation Oncology  Tel: [phone]  Fax: [phone]    Care Team:  Keith Sanchez M.D., Ph.D. (medical oncology)  Calvin Davey M.D., Ph.D. (urology)  Jos Dee III, M.D. (medicine)

## 2022-04-28 NOTE — PROGRESS NOTES
[facility]  DEPARTMENT OF RADIATION ONCOLOGY   TREATMENT SUMMARY NOTE    Name: Jeannette Johnston MRN: [id number]   : 1940 (81 year old)  Date of Service: 2022 Referring: Dr. Sanchez     Diagnosis and Cancer Staging  Malignant neoplasm of overlapping sites of bladder (H)  Staging form: Urinary Bladder, AJCC 8th Edition  - Clinical: Stage IIIA (cT3, cN0, cM0) - Signed by Suraj Moran MD on 3/4/2022      Radiation Therapy   Treatment site: Bladder and pelvic nodes with concurrent cytotoxic chemotherapy (twice weekly gemcitabine).  Treatment intent: Curative.  Delivery method: Volumetric arc radiation therapy (VMAT) with sequential boost of the bladder.  Energy: 10 MV.  Dose: 20 fractions of 200 cGy each for 4000 cGy.  Boost 1 site: Primary site.  Boost 1 treatment technique: Volumetric arc radiation therapy (VMAT).  Boost 1 energy: 10 MV.  Boost 1dose: 5 fractions of 200 cGy each for 1000 cGy.  Boost 2 site: Primary site.  Boost 2 treatment technique: Volumetric arc radiation therapy (VMAT).  Boost 2 energy: 10 MV.  Boost 2 dose: 7 fractions of 200 cGy each for 1400 cGy.  Total daily fractions and total dose: 32 fractions for 6400 cGy.   Dates: 2020-2020 (Elapsed days: 44).    Concurrent chemoradiation week 0 (fractions 1-5): Grade 0 toxicity due to radiation therapy (CTCAE 5.0).  Week 1 (06-10): Grade 0.  Week 2 (11-15): Grade 1 fatigue.  Week 3 (16-20): Grade 1 fatigue (note: beginning of improved urinary function).  Week 4 (21-25): Grade 2 fatigue. Grade 2 diarrhea (easily managed with daily Imodium. Compazine or Zofran for nausea after chemotherapy).  Week 5 (26-30): Grade 1 fatigue. Grade 2 diarrhea (easily managed with daily Imodium).  Week 6 (32): Grade 2 fatigue. Grade 0 diarrhea. Prior pelvic pain not due to radiation therapy (improved despite stopping symptomatic medications and despite continuation of radiation therapy; throat complaints were outside  "the field of radiation therapy).    Summary    The patient is a 81 year old right-handed female who is actively treated (chemoradiation) for muscle-invasive bladder cancer. In the setting of medically inoperable/surgically unresectable disease, medical oncology referred the patient for concurrent chemoradiation thereapy with potentially curative intent through bladder-sparing therapy (Primary:  7.1-cm disease, the bulk of which clinically extends beyond the bladder. Overlies left ureterovesical junction. Nodes: cN0 per CT. Metastasis: cM0 per CT. Markers: None applicable). Among the additional co-morbidities and social determinants affecting toxicity risk are chronic bilateral lower medial groin pain (presenting complaint), poor bladder function (incontinence, wet sheets, adult disposable undergarments), pubovaginal sling, and advanced age.  02/04/2022 CT    (Please note that EMR interfaces between institutions can result in loss of embedded images.)    Recent History (CE)   Despite the patient's advanced age and seeming fragility, she tolerated concurrent pelvic chemoradiation relatively well. She required no significant breaks in treatment. Urinary function improved significantly during the course of chemoradiation despite the large bladder size and extensive bladder wall involvement as well as long history of bladder dysfunction. Suprapubic pain also improved with treatment and permitted the patient to stop regular use of an over-the-counter analgesic (patient referred to this pain as \"stomach\" pain\"). Diarrhea was absent during the last portion of radiation therapy (boost phase), and regular use of Imodium was discontinued by the patient. Fatigue worsened during the course of treatment and seemed to be due to to multifactorial issues rather than mainly or solely to radiation therapy (character, timing, pattern, etc.). Therefore, fatigue is anticipated to remain a significant issue until confounding contributors " "can be identified, addressed, or resolved (actively or passively). The patient's oral intake also decreased, perhaps due to some degree of swallowing impairment following the development of lower throat discomfort plus a \"thick phlegm\". The patient also developed sinus congestion, malaise, and insomnia that she and her son attributed to yearly seasonal allergies (no fever, chills, dysgeusia, or a ageusia). We will contact the patient in the next 1-2 weeks for a routine posttreatment check.    The patient has clinical and radiographic follow-up scheduled through medical oncology. The patient will also see urology for surveillance cystoscopy. We again counseled the patient to see her primary care service for general and maintenance health issues. We will likely see the patient in approximately 3-4 months, depending on the anticipated scheduling of pelvic imaging studies and clinic follow-up with other members of the multidisciplinary genitourinary oncology team. The patient and her son wish to proceed as recommended. We answered all questions to their satisfaction. Thank you for allowing us to participate in the care of this very pleasant patient.    ROS (score of 0 indicates that symptom is at baseline for most sections below)   See Flowsheet for additional comments as indicated.  Data Unavailable due to suprapubic region crampy pain.                               Physical Examination   Vitals: There were no vitals taken for this visit.  Wt Readings from Last 3 Encounters:   04/27/22 43 kg (94 lb 12.8 oz)   04/20/22 43.9 kg (96 lb 12.8 oz)   04/13/22 45 kg (99 lb 1.6 oz)     Clinical Examination (during the week, additional physician evaluations at [facility] with the staff):  KPS: 70 (cares for self but unable to carry on normal activity or do active work).  General: Appears much brighter, rested, and more well than last week. Much more talkative. Nevertheless, still appears mildly fatigue. =Does not appear acutely or " chronically ill. Appears clinically stable. Appears in good general health. Appears non-obese. Appears moderately fatigued. Appears stated age. Appears well-developed, well-nourished, and in no acute distress. Appears well groomed.  Head: No alopecia. Normocephalic.  Eyes: Glasses. Anicteric. Vision intact.  ENT: Good volume. No hoarseness.  Neck: No jugulovenous distension.  Lymphatics: No cervical or supraclavicular adenopathy.  Chest/Breasts: No port. No implanted cardiac device.   Lungs: Easy respirations, no use of accessory muscles. Clear to auscultation.  Cardiovascular: No jugulovenous distension. No carotid pulsations. RRR, S1, S2.  Abdomen: No erythema. Nondistended, nontender. Soft. Bowel sounds positive.  Pelvis: No erythema. Nondistended, nontender. Soft. Bowel sounds positive. (previous note of adult disposable undergarment.)  MSK: No cords or calf tenderness. Shoulder range of motion is good. No arm edema or hand edema.   Integument: No jaundice or pallor.  Neurologic: Right-handed. Alert, oriented, fluent. Memory intact. Motor intact. Sensory intact. No ataxia.  Psychologic: Well-spoken about her cancer and therapy. Good dynamic with son. Less frustrated about fatigue and malaise. Motivated, relaxed, and confident. Pleasant, cooperative, insightful, and concrete.    Physician Radiation Therapy Impression   Routine tolerance to radiation therapy.    Physician Radiation Therapy Plan   No new interventions. Boost/cone down ongoing. Have reviewed graphical 3D plan with the patient and her son. Reviewed anticipated time course, nature, and potential interventions for managing toxicity during and after radiation therapy. Reviewed discharge and follow up plans. Patient aware of onsite and telemedicine coverage of our clinic. She wished to proceed with treatment. All questions answered to the satisfaction of the patient and her son.    Physician Radiation Therapy Weekly Review   Reviewed available labs and  diagnostic studies. Interpreted as adequate to proceed with radiation therapy. Discussed management with Therapy, Treatment Planning, and Nursing. Reviewed weekly routine QA, linac imaging, and clinical set up are adequate to proceed with radiation therapy as prescribed.    Allergies, Medications   Allergies/Reactions: Patient has no known allergies.    Outpatient Encounter Medications as of 4/27/2022   Medication Sig Dispense Refill     acetaminophen-caff-pyrilamine (MIDOL MENSTRUAL) 500-60-15 MG TABS per tablet Take 2 tablets by mouth 3 times daily Some days is taking three times per day and some 2 times per day       cetirizine (ZYRTEC) 10 MG tablet Take 10 mg by mouth daily (Patient not taking: Reported on 4/27/2022)       loperamide (IMODIUM) 2 MG capsule Take 2 mg by mouth 4 times daily as needed for diarrhea       omeprazole (PRILOSEC) 40 MG DR capsule Take 40 mg by mouth daily Take 1 Capsule by mouth two times a day. Do not crush.  Take 20 mins before meals (Patient not taking: Reported on 4/27/2022)       ondansetron (ZOFRAN) 8 MG tablet Take 8 mg by mouth 3 times daily as needed (Patient not taking: Reported on 4/27/2022)       oxybutynin ER (DITROPAN-XL) 5 MG 24 hr tablet Take 5 mg by mouth daily Take 1 Tablet by mouth one time a day. Do not crush. (Patient not taking: Reported on 4/27/2022)       predniSONE (DELTASONE) 1 MG tablet Take 1 mg by mouth daily TAKE 1 TAB BY MOUTH ONE TIME A DAY. TAKE WITH 5 MG TAB FOR TOTAL DOSE 6 MG PER DAY. TAKE WITH FOOD. (Patient not taking: No sig reported)       predniSONE (DELTASONE) 5 MG tablet Take 5 mg by mouth daily TAKE 1 TAB BY MOUTH ONE TIME A DAY. TAKE WITH 5 MG TAB FOR TOTAL DOSE 6 MG PER DAY. TAKE WITH FOOD. (Patient not taking: No sig reported)       prochlorperazine (COMPAZINE) 10 MG tablet Take 10 mg by mouth       No facility-administered encounter medications on file as of 4/27/2022.        Suraj Moran MD, PhD  Department of Radiation Oncology  Tel:  [phone]  Fax: [phone]    Care Team:  Keith Sanchez M.D., Ph.D. (medical oncology)  Calvin Davey M.D., Ph.D. (urology)  Jos Dee III, M.D. (medicine)

## 2022-04-29 NOTE — PROGRESS NOTES
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 Therapy, Treatment Planning, and I reviewed the anticipated radiation therapy, clinical history and documentation, and radiographic information and images. We verified written consent for treatment. With the patient, we verified their identification, site, and side of treatment. We evaluated multiple setup positions and elected to simulate the patient in a modified supine position. We used orthogonal lasers to align them with the CT simulator. We immobilized the patient with a customized torso mold and accessories to improve the reproducibility and safety for daily radiation therapy. We placed radiopaque markers to assist in identifying topographical landmarks for simulation. We obtained  and axial CT imaging through the target region. We used virtual simulation techniques to verify the adequacy of the CT images and to create a preliminary setup isocenter. Motion management was not utilized. We placed tattoos to meghan the setup isocenter. The patient tolerated the procedure well and without complications. We will use available diagnostic and radiation therapy imaging studies for CT-based treatment planning with image fusion as indicated. I anticipate utilizing a form of intensity-modulated or 3D-conformal radiation therapy to develop a computerized treatment plan whose dosimetric analysis (e.g., dose-volume histogram  (DVH)) indicates adequate coverage of target tissues and sparing of nearby normal structures. We will complete routine QA procedures. The patient wished to proceed as recommended. We answered all questions to her satisfaction.    Special Treatment Procedure/Special Physics Consult   Special Treatment Procedure is based on delivery of radiation therapy that will require additional time, effort, and resources due to administration of cytotoxic chemotherapy during radiation therapy (concurrent chemoradiation) plus supportive care and labs.      Suraj Moran M.D., Ph.D.  Department of Radiation Oncology  Tel: [phone]  Fax: [phone]

## 2022-05-11 NOTE — TELEPHONE ENCOUNTER
2 week telephone call to patient post radiation therapy to the bladder and pelvic lymph nodes. She states she is very fatigued and would like it to go away. She is resting and sitting outside in the sun as much as she can. She was educated in radiation induced fatigue to usually begin to clear up a few weeks after radiation therapy is complete. She is not eating well, states she can chew and chew and just doesn't want to swallow it. She is forcing her self to eat but it sounds like small meals at a time. Patient educated on protein drinks/ mixes, and she states she has tried everything. She does not like the protein drinks. She will try to eat more healthy foods throughout the day, and continue to eat small meals. She states adequate fluid intake. She does drink milk often. Follow up call in 2 weeks.

## 2022-05-26 NOTE — TELEPHONE ENCOUNTER
4 week telephone call made to patient post radiation therapy to the bladder. She states she is doing wonderful, she has no pain, is gaining weight, has an appetite, and is no longer incontinent. She denies further questions or concerns. Follow up scheduled in 3 months.

## 2022-07-11 NOTE — TELEPHONE ENCOUNTER
Patient call with questions regarding follow up appointment. She states she has an appointment in August, however is seeing Urology and Medical oncology regularly, and thus would be redundant unless there is a service we would additionally offer. Patient was educated that we would follow up regarding side effects from radiation therapy. She currently does not have any side effects from radiation therapy and understands medical oncology and urology will be ordering any future scans. She wishes to cancel her August appointment at this time.  Her appointment in August is canceled, and patient was educated if she feels that she needs follow-up or has questions she is more than welcome to call her clinic and be scheduled or speak with one of the staff members.  Additionally she adds that she was very thankful for her care during her time here.

## 2023-09-23 NOTE — PROGRESS NOTES
Radiation Oncology - Clinic Note      Notified by staff of the patient's arrival with her son. Feeling better but still fatigued. Offers no new complaints. Wished to proceed with concurrent chemoradiation. At the linac, patient seen with staff. Appeared clinically stable. Again stated that she and her son have been extreme with avoiding contacts for COVID-19. Stated that neither she nor her son wish to undergo COVID-19 testing. Offhandedly mention that he had brought the home test kit with him and was in the waiting room. Patient and son therefore escorted to the examination room. Home test performed. Test negative. Staff notified. Proceeded with radiation therapy.      Suraj Moran MD, PhD  Department of Radiation Oncology  Tel: [phone]  Fax: [phone]  
right/femoral vein